# Patient Record
Sex: FEMALE | Race: WHITE
[De-identification: names, ages, dates, MRNs, and addresses within clinical notes are randomized per-mention and may not be internally consistent; named-entity substitution may affect disease eponyms.]

---

## 2017-10-25 ENCOUNTER — HOSPITAL ENCOUNTER (OUTPATIENT)
Dept: HOSPITAL 8 - STAR | Age: 62
Discharge: HOME | End: 2017-10-25
Attending: NEUROLOGICAL SURGERY
Payer: COMMERCIAL

## 2017-10-25 DIAGNOSIS — R79.1: ICD-10-CM

## 2017-10-25 DIAGNOSIS — M48.062: ICD-10-CM

## 2017-10-25 DIAGNOSIS — M43.16: ICD-10-CM

## 2017-10-25 DIAGNOSIS — Z01.818: Primary | ICD-10-CM

## 2017-10-25 LAB
AST SERPL-CCNC: 17 U/L (ref 15–37)
BUN SERPL-MCNC: 16 MG/DL (ref 7–18)
HCT VFR BLD CALC: 38.6 % (ref 34.6–47.8)
HGB BLD-MCNC: 12.4 G/DL (ref 11.7–16.4)
WBC # BLD AUTO: 8.9 X10^3/UL (ref 3.4–10)

## 2017-10-25 PROCEDURE — 85025 COMPLETE CBC W/AUTO DIFF WBC: CPT

## 2017-10-25 PROCEDURE — 93005 ELECTROCARDIOGRAM TRACING: CPT

## 2017-10-25 PROCEDURE — 36415 COLL VENOUS BLD VENIPUNCTURE: CPT

## 2017-10-25 PROCEDURE — 85610 PROTHROMBIN TIME: CPT

## 2017-10-25 PROCEDURE — 85730 THROMBOPLASTIN TIME PARTIAL: CPT

## 2017-10-25 PROCEDURE — 81003 URINALYSIS AUTO W/O SCOPE: CPT

## 2017-10-25 PROCEDURE — 71020: CPT

## 2017-10-25 PROCEDURE — 80053 COMPREHEN METABOLIC PANEL: CPT

## 2018-10-29 ENCOUNTER — HOSPITAL ENCOUNTER (OUTPATIENT)
Dept: HOSPITAL 8 - RAD | Age: 63
Discharge: HOME | End: 2018-10-29
Attending: NURSE PRACTITIONER
Payer: COMMERCIAL

## 2018-10-29 DIAGNOSIS — R10.31: Primary | ICD-10-CM

## 2018-10-29 PROCEDURE — 76857 US EXAM PELVIC LIMITED: CPT

## 2018-11-02 ENCOUNTER — HOSPITAL ENCOUNTER (INPATIENT)
Dept: HOSPITAL 8 - ED | Age: 63
LOS: 8 days | Discharge: HOME | DRG: 329 | End: 2018-11-10
Attending: HOSPITALIST | Admitting: HOSPITALIST
Payer: COMMERCIAL

## 2018-11-02 VITALS — DIASTOLIC BLOOD PRESSURE: 78 MMHG | SYSTOLIC BLOOD PRESSURE: 134 MMHG

## 2018-11-02 VITALS — DIASTOLIC BLOOD PRESSURE: 97 MMHG | SYSTOLIC BLOOD PRESSURE: 148 MMHG

## 2018-11-02 VITALS — DIASTOLIC BLOOD PRESSURE: 68 MMHG | SYSTOLIC BLOOD PRESSURE: 123 MMHG

## 2018-11-02 VITALS — HEIGHT: 66 IN | BODY MASS INDEX: 24.34 KG/M2 | WEIGHT: 151.46 LBS

## 2018-11-02 VITALS — SYSTOLIC BLOOD PRESSURE: 144 MMHG | DIASTOLIC BLOOD PRESSURE: 66 MMHG

## 2018-11-02 VITALS — DIASTOLIC BLOOD PRESSURE: 87 MMHG | SYSTOLIC BLOOD PRESSURE: 134 MMHG

## 2018-11-02 VITALS — SYSTOLIC BLOOD PRESSURE: 132 MMHG | DIASTOLIC BLOOD PRESSURE: 77 MMHG

## 2018-11-02 DIAGNOSIS — E86.0: ICD-10-CM

## 2018-11-02 DIAGNOSIS — Z80.6: ICD-10-CM

## 2018-11-02 DIAGNOSIS — Z90.710: ICD-10-CM

## 2018-11-02 DIAGNOSIS — C18.2: ICD-10-CM

## 2018-11-02 DIAGNOSIS — D62: ICD-10-CM

## 2018-11-02 DIAGNOSIS — R63.0: ICD-10-CM

## 2018-11-02 DIAGNOSIS — G43.909: ICD-10-CM

## 2018-11-02 DIAGNOSIS — Z82.3: ICD-10-CM

## 2018-11-02 DIAGNOSIS — I80.8: ICD-10-CM

## 2018-11-02 DIAGNOSIS — D72.829: ICD-10-CM

## 2018-11-02 DIAGNOSIS — G89.3: ICD-10-CM

## 2018-11-02 DIAGNOSIS — F32.9: ICD-10-CM

## 2018-11-02 DIAGNOSIS — F41.9: ICD-10-CM

## 2018-11-02 DIAGNOSIS — G89.29: ICD-10-CM

## 2018-11-02 DIAGNOSIS — Z80.7: ICD-10-CM

## 2018-11-02 DIAGNOSIS — C77.2: ICD-10-CM

## 2018-11-02 DIAGNOSIS — Z80.3: ICD-10-CM

## 2018-11-02 DIAGNOSIS — C18.0: Primary | ICD-10-CM

## 2018-11-02 DIAGNOSIS — D12.5: ICD-10-CM

## 2018-11-02 DIAGNOSIS — K56.7: ICD-10-CM

## 2018-11-02 DIAGNOSIS — Z90.49: ICD-10-CM

## 2018-11-02 DIAGNOSIS — N17.0: ICD-10-CM

## 2018-11-02 DIAGNOSIS — F17.210: ICD-10-CM

## 2018-11-02 DIAGNOSIS — M48.00: ICD-10-CM

## 2018-11-02 LAB
% IRON SATURATION: 2 % (ref 20–55)
<PLATELET ESTIMATE>: (no result)
ALBUMIN SERPL-MCNC: 3.1 G/DL (ref 3.4–5)
ALP SERPL-CCNC: 96 U/L (ref 45–117)
ALT SERPL-CCNC: 19 U/L (ref 12–78)
ANION GAP SERPL CALC-SCNC: 9 MMOL/L (ref 5–15)
ANISOCYTOSIS BLD QL SMEAR: (no result)
BAND#(MANUAL): 0.1 X10^3/UL
BILIRUB SERPL-MCNC: 0.2 MG/DL (ref 0.2–1)
BURR CELLS BLD QL SMEAR: (no result)
CALCIUM SERPL-MCNC: 9 MG/DL (ref 8.5–10.1)
CHLORIDE SERPL-SCNC: 104 MMOL/L (ref 98–107)
CREAT SERPL-MCNC: 0.7 MG/DL (ref 0.55–1.02)
CULTURE INDICATED?: NO
EOS#(MANUAL): 0.29 X10^3/UL (ref 0–0.4)
EOS% (MANUAL): 3 % (ref 1–7)
ERYTHROCYTE [DISTWIDTH] IN BLOOD BY AUTOMATED COUNT: 21.9 % (ref 9.6–15.2)
HYPOCHROMIA BLD QL SMEAR: (no result)
IRON LEVEL: 11 MCG/DL (ref 50–170)
LYMPH#(MANUAL): 1.94 X10^3/UL (ref 1–3.4)
LYMPHS% (MANUAL): 20 % (ref 22–44)
MCH RBC QN AUTO: 19 PG (ref 27–34.8)
MCHC RBC AUTO-ENTMCNC: 29.9 G/DL (ref 32.4–35.8)
MCV RBC AUTO: 63.4 FL (ref 80–100)
MD: YES
MICROCYTES BLD QL SMEAR: (no result)
MICROSCOPIC: (no result)
MONOS#(MANUAL): 0.49 X10^3/UL (ref 0.3–2.7)
MONOS% (MANUAL): 5 % (ref 2–9)
NEUTS BAND NFR BLD: 1 % (ref 0–7)
OVALOCYTES BLD QL SMEAR: (no result)
PLATELET # BLD AUTO: 885 X10^3/UL (ref 130–400)
PMV BLD AUTO: 5.7 FL (ref 7.4–10.4)
POLYCHROMASIA BLD QL SMEAR: (no result)
PROT SERPL-MCNC: 7.7 G/DL (ref 6.4–8.2)
RBC # BLD AUTO: 3.42 X10^6/UL (ref 3.82–5.3)
SEG#(MANUAL): 6.89 X10^3/UL (ref 1.8–6.8)
SEGS% (MANUAL): 71 % (ref 42–75)
SMALL PLATELETS BLD QL SMEAR: (no result)
TIBC SERPL-MCNC: 450 MCG/DL (ref 250–450)

## 2018-11-02 PROCEDURE — 36415 COLL VENOUS BLD VENIPUNCTURE: CPT

## 2018-11-02 PROCEDURE — 82040 ASSAY OF SERUM ALBUMIN: CPT

## 2018-11-02 PROCEDURE — 84100 ASSAY OF PHOSPHORUS: CPT

## 2018-11-02 PROCEDURE — 83735 ASSAY OF MAGNESIUM: CPT

## 2018-11-02 PROCEDURE — 80048 BASIC METABOLIC PNL TOTAL CA: CPT

## 2018-11-02 PROCEDURE — 99152 MOD SED SAME PHYS/QHP 5/>YRS: CPT

## 2018-11-02 PROCEDURE — P9016 RBC LEUKOCYTES REDUCED: HCPCS

## 2018-11-02 PROCEDURE — 85018 HEMOGLOBIN: CPT

## 2018-11-02 PROCEDURE — 96374 THER/PROPH/DIAG INJ IV PUSH: CPT

## 2018-11-02 PROCEDURE — 85610 PROTHROMBIN TIME: CPT

## 2018-11-02 PROCEDURE — 83550 IRON BINDING TEST: CPT

## 2018-11-02 PROCEDURE — 85025 COMPLETE CBC W/AUTO DIFF WBC: CPT

## 2018-11-02 PROCEDURE — 36430 TRANSFUSION BLD/BLD COMPNT: CPT

## 2018-11-02 PROCEDURE — S0028 INJECTION, FAMOTIDINE, 20 MG: HCPCS

## 2018-11-02 PROCEDURE — 96375 TX/PRO/DX INJ NEW DRUG ADDON: CPT

## 2018-11-02 PROCEDURE — 99285 EMERGENCY DEPT VISIT HI MDM: CPT

## 2018-11-02 PROCEDURE — 83690 ASSAY OF LIPASE: CPT

## 2018-11-02 PROCEDURE — 86923 COMPATIBILITY TEST ELECTRIC: CPT

## 2018-11-02 PROCEDURE — 86900 BLOOD TYPING SEROLOGIC ABO: CPT

## 2018-11-02 PROCEDURE — 86850 RBC ANTIBODY SCREEN: CPT

## 2018-11-02 PROCEDURE — 93005 ELECTROCARDIOGRAM TRACING: CPT

## 2018-11-02 PROCEDURE — 88305 TISSUE EXAM BY PATHOLOGIST: CPT

## 2018-11-02 PROCEDURE — 83540 ASSAY OF IRON: CPT

## 2018-11-02 PROCEDURE — 88307 TISSUE EXAM BY PATHOLOGIST: CPT

## 2018-11-02 PROCEDURE — 74177 CT ABD & PELVIS W/CONTRAST: CPT

## 2018-11-02 PROCEDURE — 71260 CT THORAX DX C+: CPT

## 2018-11-02 PROCEDURE — 82378 CARCINOEMBRYONIC ANTIGEN: CPT

## 2018-11-02 PROCEDURE — 80053 COMPREHEN METABOLIC PANEL: CPT

## 2018-11-02 PROCEDURE — 30233N1 TRANSFUSION OF NONAUTOLOGOUS RED BLOOD CELLS INTO PERIPHERAL VEIN, PERCUTANEOUS APPROACH: ICD-10-PCS | Performed by: SURGERY

## 2018-11-02 PROCEDURE — 99153 MOD SED SAME PHYS/QHP EA: CPT

## 2018-11-02 PROCEDURE — 81003 URINALYSIS AUTO W/O SCOPE: CPT

## 2018-11-02 RX ADMIN — FAMOTIDINE SCH MG: 10 INJECTION INTRAVENOUS at 21:18

## 2018-11-02 RX ADMIN — PREGABALIN SCH MG: 25 CAPSULE ORAL at 21:00

## 2018-11-02 RX ADMIN — NICOTINE SCH PATCH: 14 PATCH, EXTENDED RELEASE TRANSDERMAL at 17:00

## 2018-11-03 VITALS — SYSTOLIC BLOOD PRESSURE: 149 MMHG | DIASTOLIC BLOOD PRESSURE: 74 MMHG

## 2018-11-03 VITALS — SYSTOLIC BLOOD PRESSURE: 138 MMHG | DIASTOLIC BLOOD PRESSURE: 74 MMHG

## 2018-11-03 VITALS — SYSTOLIC BLOOD PRESSURE: 151 MMHG | DIASTOLIC BLOOD PRESSURE: 82 MMHG

## 2018-11-03 VITALS — SYSTOLIC BLOOD PRESSURE: 143 MMHG | DIASTOLIC BLOOD PRESSURE: 80 MMHG

## 2018-11-03 LAB
<PLATELET ESTIMATE>: (no result)
ANION GAP SERPL CALC-SCNC: 8 MMOL/L (ref 5–15)
ANISOCYTOSIS BLD QL SMEAR: (no result)
CALCIUM SERPL-MCNC: 8.6 MG/DL (ref 8.5–10.1)
CHLORIDE SERPL-SCNC: 107 MMOL/L (ref 98–107)
CREAT SERPL-MCNC: 0.68 MG/DL (ref 0.55–1.02)
ERYTHROCYTE [DISTWIDTH] IN BLOOD BY AUTOMATED COUNT: 25.8 % (ref 9.6–15.2)
HYPOCHROMIA BLD QL SMEAR: (no result)
INR PPP: 1.02 (ref 0.93–1.1)
LYMPH#(MANUAL): 1.66 X10^3/UL (ref 1–3.4)
LYMPHS% (MANUAL): 18 % (ref 22–44)
MCH RBC QN AUTO: 20.8 PG (ref 27–34.8)
MCHC RBC AUTO-ENTMCNC: 31 G/DL (ref 32.4–35.8)
MCV RBC AUTO: 67 FL (ref 80–100)
MD: YES
MICROCYTES BLD QL SMEAR: (no result)
MONOS#(MANUAL): 1.01 X10^3/UL (ref 0.3–2.7)
MONOS% (MANUAL): 11 % (ref 2–9)
OVALOCYTES BLD QL SMEAR: (no result)
PLATELET # BLD AUTO: 722 X10^3/UL (ref 130–400)
PMV BLD AUTO: 5.7 FL (ref 7.4–10.4)
POLYCHROMASIA BLD QL SMEAR: (no result)
PROTHROMBIN TIME: 10.6 SECONDS (ref 9.6–11.5)
RBC # BLD AUTO: 3.56 X10^6/UL (ref 3.82–5.3)
SEG#(MANUAL): 6.53 X10^3/UL (ref 1.8–6.8)
SEGS% (MANUAL): 71 % (ref 42–75)
SMALL PLATELETS BLD QL SMEAR: (no result)

## 2018-11-03 PROCEDURE — 0DJD8ZZ INSPECTION OF LOWER INTESTINAL TRACT, VIA NATURAL OR ARTIFICIAL OPENING ENDOSCOPIC: ICD-10-PCS | Performed by: INTERNAL MEDICINE

## 2018-11-03 RX ADMIN — NICOTINE SCH PATCH: 14 PATCH, EXTENDED RELEASE TRANSDERMAL at 17:00

## 2018-11-03 RX ADMIN — PREGABALIN SCH MG: 25 CAPSULE ORAL at 19:50

## 2018-11-03 RX ADMIN — DULOXETINE HYDROCHLORIDE SCH MG: 30 CAPSULE, DELAYED RELEASE ORAL at 10:05

## 2018-11-03 RX ADMIN — FAMOTIDINE SCH MG: 10 INJECTION INTRAVENOUS at 19:50

## 2018-11-03 RX ADMIN — PREGABALIN SCH MG: 25 CAPSULE ORAL at 09:00

## 2018-11-03 RX ADMIN — DOCUSATE SODIUM 50MG AND SENNOSIDES 8.6MG SCH TAB: 8.6; 5 TABLET, FILM COATED ORAL at 10:05

## 2018-11-03 RX ADMIN — FAMOTIDINE SCH MG: 10 INJECTION INTRAVENOUS at 10:05

## 2018-11-04 VITALS — DIASTOLIC BLOOD PRESSURE: 77 MMHG | SYSTOLIC BLOOD PRESSURE: 132 MMHG

## 2018-11-04 VITALS — SYSTOLIC BLOOD PRESSURE: 137 MMHG | DIASTOLIC BLOOD PRESSURE: 72 MMHG

## 2018-11-04 VITALS — DIASTOLIC BLOOD PRESSURE: 80 MMHG | SYSTOLIC BLOOD PRESSURE: 136 MMHG

## 2018-11-04 VITALS — SYSTOLIC BLOOD PRESSURE: 129 MMHG | DIASTOLIC BLOOD PRESSURE: 83 MMHG

## 2018-11-04 LAB
<PLATELET ESTIMATE>: (no result)
<PLT MORPHOLOGY>: (no result)
ANION GAP SERPL CALC-SCNC: 9 MMOL/L (ref 5–15)
ANISOCYTOSIS BLD QL SMEAR: (no result)
CALCIUM SERPL-MCNC: 8.1 MG/DL (ref 8.5–10.1)
CHLORIDE SERPL-SCNC: 105 MMOL/L (ref 98–107)
CREAT SERPL-MCNC: 0.63 MG/DL (ref 0.55–1.02)
EOS#(MANUAL): 0.24 X10^3/UL (ref 0–0.4)
EOS% (MANUAL): 3 % (ref 1–7)
ERYTHROCYTE [DISTWIDTH] IN BLOOD BY AUTOMATED COUNT: 26.4 % (ref 9.6–15.2)
HYPOCHROMIA BLD QL SMEAR: (no result)
LYMPH#(MANUAL): 1.7 X10^3/UL (ref 1–3.4)
LYMPHS% (MANUAL): 21 % (ref 22–44)
MCH RBC QN AUTO: 20.3 PG (ref 27–34.8)
MCHC RBC AUTO-ENTMCNC: 30.2 G/DL (ref 32.4–35.8)
MCV RBC AUTO: 67.3 FL (ref 80–100)
MD: YES
MICROCYTES BLD QL SMEAR: (no result)
MONOS#(MANUAL): 0.81 X10^3/UL (ref 0.3–2.7)
MONOS% (MANUAL): 10 % (ref 2–9)
OVALOCYTES BLD QL SMEAR: (no result)
PLATELET # BLD AUTO: 742 X10^3/UL (ref 130–400)
PMV BLD AUTO: 5.8 FL (ref 7.4–10.4)
POLYCHROMASIA BLD QL SMEAR: (no result)
RBC # BLD AUTO: 3.68 X10^6/UL (ref 3.82–5.3)
SEG#(MANUAL): 5.35 X10^3/UL (ref 1.8–6.8)
SEGS% (MANUAL): 66 % (ref 42–75)

## 2018-11-04 PROCEDURE — 0DBK8ZX EXCISION OF ASCENDING COLON, VIA NATURAL OR ARTIFICIAL OPENING ENDOSCOPIC, DIAGNOSTIC: ICD-10-PCS | Performed by: INTERNAL MEDICINE

## 2018-11-04 PROCEDURE — 0DBN8ZZ EXCISION OF SIGMOID COLON, VIA NATURAL OR ARTIFICIAL OPENING ENDOSCOPIC: ICD-10-PCS | Performed by: INTERNAL MEDICINE

## 2018-11-04 RX ADMIN — DOCUSATE SODIUM 50MG AND SENNOSIDES 8.6MG SCH TAB: 8.6; 5 TABLET, FILM COATED ORAL at 09:00

## 2018-11-04 RX ADMIN — FAMOTIDINE SCH MG: 10 INJECTION INTRAVENOUS at 12:00

## 2018-11-04 RX ADMIN — PREGABALIN SCH MG: 25 CAPSULE ORAL at 09:00

## 2018-11-04 RX ADMIN — DULOXETINE HYDROCHLORIDE SCH MG: 30 CAPSULE, DELAYED RELEASE ORAL at 11:30

## 2018-11-04 RX ADMIN — PREGABALIN SCH MG: 25 CAPSULE ORAL at 21:00

## 2018-11-04 RX ADMIN — NICOTINE SCH PATCH: 14 PATCH, EXTENDED RELEASE TRANSDERMAL at 17:00

## 2018-11-04 RX ADMIN — FAMOTIDINE SCH MG: 20 TABLET, FILM COATED ORAL at 21:19

## 2018-11-04 RX ADMIN — IRON SUCROSE SCH MG: 20 INJECTION, SOLUTION INTRAVENOUS at 11:46

## 2018-11-05 VITALS — DIASTOLIC BLOOD PRESSURE: 64 MMHG | SYSTOLIC BLOOD PRESSURE: 117 MMHG

## 2018-11-05 VITALS — DIASTOLIC BLOOD PRESSURE: 75 MMHG | SYSTOLIC BLOOD PRESSURE: 127 MMHG

## 2018-11-05 VITALS — SYSTOLIC BLOOD PRESSURE: 128 MMHG | DIASTOLIC BLOOD PRESSURE: 76 MMHG

## 2018-11-05 VITALS — DIASTOLIC BLOOD PRESSURE: 77 MMHG | SYSTOLIC BLOOD PRESSURE: 117 MMHG

## 2018-11-05 LAB
<PLATELET ESTIMATE>: (no result)
ANION GAP SERPL CALC-SCNC: 9 MMOL/L (ref 5–15)
ANISOCYTOSIS BLD QL SMEAR: (no result)
BASOPHILS NFR BLD MANUAL: 1 % (ref 0–1)
BASOS#(MANUAL): 0.09 X10^3/UL (ref 0–0.1)
CALCIUM SERPL-MCNC: 8.9 MG/DL (ref 8.5–10.1)
CHLORIDE SERPL-SCNC: 109 MMOL/L (ref 98–107)
CREAT SERPL-MCNC: 0.84 MG/DL (ref 0.55–1.02)
EOS#(MANUAL): 0.09 X10^3/UL (ref 0–0.4)
EOS% (MANUAL): 1 % (ref 1–7)
ERYTHROCYTE [DISTWIDTH] IN BLOOD BY AUTOMATED COUNT: 26.7 % (ref 9.6–15.2)
HYPOCHROMIA BLD QL SMEAR: (no result)
LYMPH#(MANUAL): 1.49 X10^3/UL (ref 1–3.4)
LYMPHS% (MANUAL): 16 % (ref 22–44)
MCH RBC QN AUTO: 20.6 PG (ref 27–34.8)
MCHC RBC AUTO-ENTMCNC: 30.3 G/DL (ref 32.4–35.8)
MCV RBC AUTO: 68.2 FL (ref 80–100)
MD: YES
MICROCYTES BLD QL SMEAR: (no result)
MONOS#(MANUAL): 1.21 X10^3/UL (ref 0.3–2.7)
MONOS% (MANUAL): 13 % (ref 2–9)
OVALOCYTES BLD QL SMEAR: (no result)
PLATELET # BLD AUTO: 703 X10^3/UL (ref 130–400)
PMV BLD AUTO: 6.3 FL (ref 7.4–10.4)
POLYCHROMASIA BLD QL SMEAR: (no result)
RBC # BLD AUTO: 3.83 X10^6/UL (ref 3.82–5.3)
SEG#(MANUAL): 6.42 X10^3/UL (ref 1.8–6.8)
SEGS% (MANUAL): 69 % (ref 42–75)
SMALL PLATELETS BLD QL SMEAR: (no result)
SPHEROCYTES BLD QL SMEAR: (no result)

## 2018-11-05 RX ADMIN — IRON SUCROSE SCH MG: 20 INJECTION, SOLUTION INTRAVENOUS at 09:22

## 2018-11-05 RX ADMIN — FAMOTIDINE SCH MG: 20 TABLET, FILM COATED ORAL at 21:20

## 2018-11-05 RX ADMIN — DOCUSATE SODIUM 50MG AND SENNOSIDES 8.6MG SCH TAB: 8.6; 5 TABLET, FILM COATED ORAL at 09:00

## 2018-11-05 RX ADMIN — PREGABALIN SCH MG: 25 CAPSULE ORAL at 21:00

## 2018-11-05 RX ADMIN — DULOXETINE HYDROCHLORIDE SCH MG: 30 CAPSULE, DELAYED RELEASE ORAL at 09:00

## 2018-11-05 RX ADMIN — PREGABALIN SCH MG: 25 CAPSULE ORAL at 09:00

## 2018-11-05 RX ADMIN — FAMOTIDINE SCH MG: 20 TABLET, FILM COATED ORAL at 09:22

## 2018-11-05 RX ADMIN — NICOTINE SCH PATCH: 14 PATCH, EXTENDED RELEASE TRANSDERMAL at 17:00

## 2018-11-06 VITALS — SYSTOLIC BLOOD PRESSURE: 132 MMHG | DIASTOLIC BLOOD PRESSURE: 79 MMHG

## 2018-11-06 VITALS — DIASTOLIC BLOOD PRESSURE: 54 MMHG | SYSTOLIC BLOOD PRESSURE: 108 MMHG

## 2018-11-06 VITALS — SYSTOLIC BLOOD PRESSURE: 109 MMHG | DIASTOLIC BLOOD PRESSURE: 65 MMHG

## 2018-11-06 VITALS — DIASTOLIC BLOOD PRESSURE: 72 MMHG | SYSTOLIC BLOOD PRESSURE: 138 MMHG

## 2018-11-06 LAB
<PLATELET ESTIMATE>: (no result)
<PLT MORPHOLOGY>: (no result)
ANION GAP SERPL CALC-SCNC: 8 MMOL/L (ref 5–15)
ANISOCYTOSIS BLD QL SMEAR: (no result)
BAND#(MANUAL): 0.11 X10^3/UL
BASOPHILS NFR BLD MANUAL: 1 % (ref 0–1)
BASOS#(MANUAL): 0.11 X10^3/UL (ref 0–0.1)
CALCIUM SERPL-MCNC: 8.7 MG/DL (ref 8.5–10.1)
CHLORIDE SERPL-SCNC: 107 MMOL/L (ref 98–107)
CREAT SERPL-MCNC: 0.83 MG/DL (ref 0.55–1.02)
EOS#(MANUAL): 0.46 X10^3/UL (ref 0–0.4)
EOS% (MANUAL): 4 % (ref 1–7)
ERYTHROCYTE [DISTWIDTH] IN BLOOD BY AUTOMATED COUNT: 26.9 % (ref 9.6–15.2)
HYPOCHROMIA BLD QL SMEAR: (no result)
LYMPH#(MANUAL): 3.08 X10^3/UL (ref 1–3.4)
LYMPHS% (MANUAL): 27 % (ref 22–44)
MCH RBC QN AUTO: 20.9 PG (ref 27–34.8)
MCHC RBC AUTO-ENTMCNC: 30.8 G/DL (ref 32.4–35.8)
MCV RBC AUTO: 67.9 FL (ref 80–100)
MD: YES
MICROCYTES BLD QL SMEAR: (no result)
MONOS#(MANUAL): 0.68 X10^3/UL (ref 0.3–2.7)
MONOS% (MANUAL): 6 % (ref 2–9)
NEUTS BAND NFR BLD: 1 % (ref 0–7)
OVALOCYTES BLD QL SMEAR: (no result)
PLATELET # BLD AUTO: 702 X10^3/UL (ref 130–400)
PMV BLD AUTO: 6.1 FL (ref 7.4–10.4)
POLYCHROMASIA BLD QL SMEAR: (no result)
RBC # BLD AUTO: 3.63 X10^6/UL (ref 3.82–5.3)
SCHISTOCYTES BLD QL SMEAR: (no result)
SEG#(MANUAL): 6.95 X10^3/UL (ref 1.8–6.8)
SEGS% (MANUAL): 61 % (ref 42–75)

## 2018-11-06 PROCEDURE — 0DTF4ZZ RESECTION OF RIGHT LARGE INTESTINE, PERCUTANEOUS ENDOSCOPIC APPROACH: ICD-10-PCS | Performed by: SURGERY

## 2018-11-06 PROCEDURE — 8E0W4CZ ROBOTIC ASSISTED PROCEDURE OF TRUNK REGION, PERCUTANEOUS ENDOSCOPIC APPROACH: ICD-10-PCS | Performed by: SURGERY

## 2018-11-06 PROCEDURE — 0D1H4Z4 BYPASS CECUM TO CUTANEOUS, PERCUTANEOUS ENDOSCOPIC APPROACH: ICD-10-PCS | Performed by: SURGERY

## 2018-11-06 RX ADMIN — PREGABALIN SCH MG: 25 CAPSULE ORAL at 09:00

## 2018-11-06 RX ADMIN — CEFTRIAXONE SCH MLS/HR: 2 INJECTION, SOLUTION INTRAVENOUS at 20:56

## 2018-11-06 RX ADMIN — NICOTINE SCH PATCH: 14 PATCH, EXTENDED RELEASE TRANSDERMAL at 09:21

## 2018-11-06 RX ADMIN — DULOXETINE HYDROCHLORIDE SCH MG: 30 CAPSULE, DELAYED RELEASE ORAL at 09:00

## 2018-11-06 RX ADMIN — FAMOTIDINE SCH MG: 20 TABLET, FILM COATED ORAL at 09:00

## 2018-11-06 RX ADMIN — DOCUSATE SODIUM 50MG AND SENNOSIDES 8.6MG SCH TAB: 8.6; 5 TABLET, FILM COATED ORAL at 09:00

## 2018-11-06 RX ADMIN — IRON SUCROSE SCH MG: 20 INJECTION, SOLUTION INTRAVENOUS at 09:20

## 2018-11-06 RX ADMIN — ACETAMINOPHEN SCH MLS/HR: 10 INJECTION, SOLUTION INTRAVENOUS at 23:59

## 2018-11-06 RX ADMIN — PREGABALIN SCH MG: 25 CAPSULE ORAL at 20:57

## 2018-11-06 RX ADMIN — ENOXAPARIN SODIUM SCH MG: 30 INJECTION SUBCUTANEOUS at 18:00

## 2018-11-06 RX ADMIN — NICOTINE SCH PATCH: 14 PATCH, EXTENDED RELEASE TRANSDERMAL at 18:00

## 2018-11-06 RX ADMIN — ACETAMINOPHEN SCH MLS/HR: 10 INJECTION, SOLUTION INTRAVENOUS at 18:30

## 2018-11-06 RX ADMIN — FAMOTIDINE SCH MG: 20 TABLET, FILM COATED ORAL at 20:57

## 2018-11-07 VITALS — SYSTOLIC BLOOD PRESSURE: 115 MMHG | DIASTOLIC BLOOD PRESSURE: 65 MMHG

## 2018-11-07 VITALS — SYSTOLIC BLOOD PRESSURE: 115 MMHG | DIASTOLIC BLOOD PRESSURE: 66 MMHG

## 2018-11-07 VITALS — DIASTOLIC BLOOD PRESSURE: 53 MMHG | SYSTOLIC BLOOD PRESSURE: 110 MMHG

## 2018-11-07 VITALS — SYSTOLIC BLOOD PRESSURE: 115 MMHG | DIASTOLIC BLOOD PRESSURE: 72 MMHG

## 2018-11-07 LAB
<PLATELET ESTIMATE>: (no result)
<PLT MORPHOLOGY>: (no result)
ALBUMIN SERPL-MCNC: 2.6 G/DL (ref 3.4–5)
ALP SERPL-CCNC: 84 U/L (ref 45–117)
ALT SERPL-CCNC: 20 U/L (ref 12–78)
ANION GAP SERPL CALC-SCNC: 9 MMOL/L (ref 5–15)
ANISOCYTOSIS BLD QL SMEAR: (no result)
BILIRUB SERPL-MCNC: 0.2 MG/DL (ref 0.2–1)
CALCIUM SERPL-MCNC: 8.1 MG/DL (ref 8.5–10.1)
CHLORIDE SERPL-SCNC: 106 MMOL/L (ref 98–107)
CREAT SERPL-MCNC: 1.06 MG/DL (ref 0.55–1.02)
ERYTHROCYTE [DISTWIDTH] IN BLOOD BY AUTOMATED COUNT: 27.8 % (ref 9.6–15.2)
HYPOCHROMIA BLD QL SMEAR: (no result)
LYMPH#(MANUAL): 0.31 X10^3/UL (ref 1–3.4)
LYMPHS% (MANUAL): 2 % (ref 22–44)
MCH RBC QN AUTO: 21.1 PG (ref 27–34.8)
MCHC RBC AUTO-ENTMCNC: 30.6 G/DL (ref 32.4–35.8)
MCV RBC AUTO: 69 FL (ref 80–100)
MD: YES
MICROCYTES BLD QL SMEAR: (no result)
OVALOCYTES BLD QL SMEAR: (no result)
PLATELET # BLD AUTO: 678 X10^3/UL (ref 130–400)
PMV BLD AUTO: 6.3 FL (ref 7.4–10.4)
POLYCHROMASIA BLD QL SMEAR: (no result)
PROT SERPL-MCNC: 6.8 G/DL (ref 6.4–8.2)
RBC # BLD AUTO: 3.48 X10^6/UL (ref 3.82–5.3)
SEG#(MANUAL): 15.09 X10^3/UL (ref 1.8–6.8)
SEGS% (MANUAL): 98 % (ref 42–75)
TOXIC GRAN: (no result)

## 2018-11-07 RX ADMIN — ENOXAPARIN SODIUM SCH MG: 30 INJECTION SUBCUTANEOUS at 20:41

## 2018-11-07 RX ADMIN — ACETAMINOPHEN SCH MLS/HR: 10 INJECTION, SOLUTION INTRAVENOUS at 05:48

## 2018-11-07 RX ADMIN — ENOXAPARIN SODIUM SCH MG: 30 INJECTION SUBCUTANEOUS at 08:36

## 2018-11-07 RX ADMIN — IRON SUCROSE SCH MG: 20 INJECTION, SOLUTION INTRAVENOUS at 08:37

## 2018-11-07 RX ADMIN — OXYCODONE HYDROCHLORIDE PRN MG: 5 TABLET ORAL at 17:52

## 2018-11-07 RX ADMIN — ACETAMINOPHEN SCH MLS/HR: 10 INJECTION, SOLUTION INTRAVENOUS at 11:49

## 2018-11-07 RX ADMIN — FAMOTIDINE SCH MG: 20 TABLET, FILM COATED ORAL at 08:37

## 2018-11-07 RX ADMIN — NICOTINE SCH PATCH: 14 PATCH, EXTENDED RELEASE TRANSDERMAL at 16:55

## 2018-11-07 RX ADMIN — PREGABALIN SCH MG: 25 CAPSULE ORAL at 21:00

## 2018-11-07 RX ADMIN — FAMOTIDINE SCH MG: 20 TABLET, FILM COATED ORAL at 20:41

## 2018-11-07 RX ADMIN — NICOTINE SCH PATCH: 14 PATCH, EXTENDED RELEASE TRANSDERMAL at 18:00

## 2018-11-07 RX ADMIN — DULOXETINE HYDROCHLORIDE SCH MG: 30 CAPSULE, DELAYED RELEASE ORAL at 08:37

## 2018-11-07 RX ADMIN — SODIUM CHLORIDE, SODIUM LACTATE, POTASSIUM CHLORIDE, AND CALCIUM CHLORIDE SCH MLS/HR: .6; .31; .03; .02 INJECTION, SOLUTION INTRAVENOUS at 23:02

## 2018-11-07 RX ADMIN — DOCUSATE SODIUM 50MG AND SENNOSIDES 8.6MG SCH TAB: 8.6; 5 TABLET, FILM COATED ORAL at 08:37

## 2018-11-07 RX ADMIN — CEFTRIAXONE SCH MLS/HR: 2 INJECTION, SOLUTION INTRAVENOUS at 18:21

## 2018-11-07 RX ADMIN — PREGABALIN SCH MG: 25 CAPSULE ORAL at 08:36

## 2018-11-08 VITALS — SYSTOLIC BLOOD PRESSURE: 111 MMHG | DIASTOLIC BLOOD PRESSURE: 61 MMHG

## 2018-11-08 VITALS — SYSTOLIC BLOOD PRESSURE: 116 MMHG | DIASTOLIC BLOOD PRESSURE: 81 MMHG

## 2018-11-08 VITALS — DIASTOLIC BLOOD PRESSURE: 66 MMHG | SYSTOLIC BLOOD PRESSURE: 113 MMHG

## 2018-11-08 VITALS — SYSTOLIC BLOOD PRESSURE: 134 MMHG | DIASTOLIC BLOOD PRESSURE: 73 MMHG

## 2018-11-08 VITALS — DIASTOLIC BLOOD PRESSURE: 79 MMHG | SYSTOLIC BLOOD PRESSURE: 133 MMHG

## 2018-11-08 VITALS — SYSTOLIC BLOOD PRESSURE: 131 MMHG | DIASTOLIC BLOOD PRESSURE: 72 MMHG

## 2018-11-08 VITALS — DIASTOLIC BLOOD PRESSURE: 62 MMHG | SYSTOLIC BLOOD PRESSURE: 112 MMHG

## 2018-11-08 VITALS — DIASTOLIC BLOOD PRESSURE: 71 MMHG | SYSTOLIC BLOOD PRESSURE: 131 MMHG

## 2018-11-08 VITALS — SYSTOLIC BLOOD PRESSURE: 132 MMHG | DIASTOLIC BLOOD PRESSURE: 74 MMHG

## 2018-11-08 VITALS — SYSTOLIC BLOOD PRESSURE: 128 MMHG | DIASTOLIC BLOOD PRESSURE: 77 MMHG

## 2018-11-08 LAB
<PLATELET ESTIMATE>: (no result)
<PLT MORPHOLOGY>: (no result)
ALBUMIN SERPL-MCNC: 2.4 G/DL (ref 3.4–5)
ALP SERPL-CCNC: 75 U/L (ref 45–117)
ALT SERPL-CCNC: 19 U/L (ref 12–78)
ANION GAP SERPL CALC-SCNC: 4 MMOL/L (ref 5–15)
ANISOCYTOSIS BLD QL SMEAR: (no result)
BILIRUB SERPL-MCNC: 0.2 MG/DL (ref 0.2–1)
CALCIUM SERPL-MCNC: 8.5 MG/DL (ref 8.5–10.1)
CHLORIDE SERPL-SCNC: 110 MMOL/L (ref 98–107)
CREAT SERPL-MCNC: 0.75 MG/DL (ref 0.55–1.02)
EOS#(MANUAL): 0.12 X10^3/UL (ref 0–0.4)
EOS% (MANUAL): 1 % (ref 1–7)
ERYTHROCYTE [DISTWIDTH] IN BLOOD BY AUTOMATED COUNT: 29.5 % (ref 9.6–15.2)
HYPOCHROMIA BLD QL SMEAR: (no result)
LYMPH#(MANUAL): 1.39 X10^3/UL (ref 1–3.4)
LYMPHS% (MANUAL): 12 % (ref 22–44)
MCH RBC QN AUTO: 21.4 PG (ref 27–34.8)
MCHC RBC AUTO-ENTMCNC: 30.5 G/DL (ref 32.4–35.8)
MCV RBC AUTO: 70.1 FL (ref 80–100)
MD: YES
MICROCYTES BLD QL SMEAR: (no result)
MONOS#(MANUAL): 0.12 X10^3/UL (ref 0.3–2.7)
MONOS% (MANUAL): 1 % (ref 2–9)
OVALOCYTES BLD QL SMEAR: (no result)
PLATELET # BLD AUTO: 658 X10^3/UL (ref 130–400)
PMV BLD AUTO: 6.2 FL (ref 7.4–10.4)
POLYCHROMASIA BLD QL SMEAR: (no result)
PROT SERPL-MCNC: 6.3 G/DL (ref 6.4–8.2)
RBC # BLD AUTO: 2.99 X10^6/UL (ref 3.82–5.3)
SEG#(MANUAL): 9.98 X10^3/UL (ref 1.8–6.8)
SEGS% (MANUAL): 86 % (ref 42–75)

## 2018-11-08 RX ADMIN — OXYCODONE HYDROCHLORIDE PRN MG: 5 TABLET ORAL at 16:22

## 2018-11-08 RX ADMIN — DULOXETINE HYDROCHLORIDE SCH MG: 30 CAPSULE, DELAYED RELEASE ORAL at 09:05

## 2018-11-08 RX ADMIN — IRON SUCROSE SCH MG: 20 INJECTION, SOLUTION INTRAVENOUS at 09:04

## 2018-11-08 RX ADMIN — ENOXAPARIN SODIUM SCH MG: 30 INJECTION SUBCUTANEOUS at 19:30

## 2018-11-08 RX ADMIN — FAMOTIDINE SCH MG: 20 TABLET, FILM COATED ORAL at 09:06

## 2018-11-08 RX ADMIN — MAGNESIUM HYDROXIDE SCH ML: 1200 SUSPENSION ORAL at 20:05

## 2018-11-08 RX ADMIN — SODIUM CHLORIDE, SODIUM LACTATE, POTASSIUM CHLORIDE, AND CALCIUM CHLORIDE SCH MLS/HR: .6; .31; .03; .02 INJECTION, SOLUTION INTRAVENOUS at 09:05

## 2018-11-08 RX ADMIN — OXYCODONE HYDROCHLORIDE PRN MG: 5 TABLET ORAL at 09:05

## 2018-11-08 RX ADMIN — PREGABALIN SCH MG: 25 CAPSULE ORAL at 09:00

## 2018-11-08 RX ADMIN — NICOTINE SCH PATCH: 14 PATCH, EXTENDED RELEASE TRANSDERMAL at 18:00

## 2018-11-08 RX ADMIN — PREGABALIN SCH MG: 25 CAPSULE ORAL at 20:07

## 2018-11-08 RX ADMIN — DOCUSATE SODIUM 50MG AND SENNOSIDES 8.6MG SCH TAB: 8.6; 5 TABLET, FILM COATED ORAL at 09:05

## 2018-11-08 RX ADMIN — ENOXAPARIN SODIUM SCH MG: 30 INJECTION SUBCUTANEOUS at 09:00

## 2018-11-08 RX ADMIN — OXYCODONE HYDROCHLORIDE PRN MG: 5 TABLET ORAL at 01:48

## 2018-11-08 RX ADMIN — OXYCODONE HYDROCHLORIDE PRN MG: 5 TABLET ORAL at 23:38

## 2018-11-08 RX ADMIN — CEFTRIAXONE SCH MLS/HR: 2 INJECTION, SOLUTION INTRAVENOUS at 20:05

## 2018-11-08 RX ADMIN — FAMOTIDINE SCH MG: 20 TABLET, FILM COATED ORAL at 20:05

## 2018-11-09 VITALS — SYSTOLIC BLOOD PRESSURE: 156 MMHG | DIASTOLIC BLOOD PRESSURE: 88 MMHG

## 2018-11-09 VITALS — SYSTOLIC BLOOD PRESSURE: 155 MMHG | DIASTOLIC BLOOD PRESSURE: 88 MMHG

## 2018-11-09 VITALS — DIASTOLIC BLOOD PRESSURE: 87 MMHG | SYSTOLIC BLOOD PRESSURE: 140 MMHG

## 2018-11-09 VITALS — SYSTOLIC BLOOD PRESSURE: 139 MMHG | DIASTOLIC BLOOD PRESSURE: 91 MMHG

## 2018-11-09 LAB
<PLATELET ESTIMATE>: (no result)
<PLT MORPHOLOGY>: (no result)
ALBUMIN SERPL-MCNC: 2.6 G/DL (ref 3.4–5)
ALP SERPL-CCNC: 97 U/L (ref 45–117)
ALT SERPL-CCNC: 18 U/L (ref 12–78)
ANION GAP SERPL CALC-SCNC: 10 MMOL/L (ref 5–15)
ANISOCYTOSIS BLD QL SMEAR: (no result)
BAND#(MANUAL): 0.42 X10^3/UL
BILIRUB SERPL-MCNC: 0.4 MG/DL (ref 0.2–1)
CALCIUM SERPL-MCNC: 9.2 MG/DL (ref 8.5–10.1)
CHLORIDE SERPL-SCNC: 100 MMOL/L (ref 98–107)
CREAT SERPL-MCNC: 0.67 MG/DL (ref 0.55–1.02)
ERYTHROCYTE [DISTWIDTH] IN BLOOD BY AUTOMATED COUNT: 30 % (ref 9.6–15.2)
LYMPH#(MANUAL): 4.99 X10^3/UL (ref 1–3.4)
LYMPHS% (MANUAL): 24 % (ref 22–44)
MCH RBC QN AUTO: 23.8 PG (ref 27–34.8)
MCHC RBC AUTO-ENTMCNC: 32.1 G/DL (ref 32.4–35.8)
MCV RBC AUTO: 74 FL (ref 80–100)
MD: YES
MICROCYTES BLD QL SMEAR: (no result)
NEUTS BAND NFR BLD: 2 % (ref 0–7)
PLATELET # BLD AUTO: 729 X10^3/UL (ref 130–400)
PMV BLD AUTO: 6.9 FL (ref 7.4–10.4)
PROT SERPL-MCNC: 7 G/DL (ref 6.4–8.2)
RBC # BLD AUTO: 4.79 X10^6/UL (ref 3.82–5.3)
SEG#(MANUAL): 15.39 X10^3/UL (ref 1.8–6.8)
SEGS% (MANUAL): 74 % (ref 42–75)

## 2018-11-09 RX ADMIN — ENOXAPARIN SODIUM SCH MG: 30 INJECTION SUBCUTANEOUS at 09:31

## 2018-11-09 RX ADMIN — PREGABALIN SCH MG: 25 CAPSULE ORAL at 09:00

## 2018-11-09 RX ADMIN — PREGABALIN SCH MG: 25 CAPSULE ORAL at 20:16

## 2018-11-09 RX ADMIN — METOCLOPRAMIDE SCH MG: 5 INJECTION, SOLUTION INTRAMUSCULAR; INTRAVENOUS at 14:25

## 2018-11-09 RX ADMIN — CEFTRIAXONE SCH MLS/HR: 2 INJECTION, SOLUTION INTRAVENOUS at 20:15

## 2018-11-09 RX ADMIN — PREGABALIN SCH MG: 25 CAPSULE ORAL at 20:14

## 2018-11-09 RX ADMIN — DULOXETINE HYDROCHLORIDE SCH MG: 30 CAPSULE, DELAYED RELEASE ORAL at 09:31

## 2018-11-09 RX ADMIN — DOCUSATE SODIUM 50MG AND SENNOSIDES 8.6MG SCH TAB: 8.6; 5 TABLET, FILM COATED ORAL at 09:31

## 2018-11-09 RX ADMIN — MAGNESIUM HYDROXIDE SCH ML: 1200 SUSPENSION ORAL at 20:14

## 2018-11-09 RX ADMIN — NICOTINE SCH PATCH: 14 PATCH, EXTENDED RELEASE TRANSDERMAL at 18:00

## 2018-11-09 RX ADMIN — METOCLOPRAMIDE SCH MG: 5 INJECTION, SOLUTION INTRAMUSCULAR; INTRAVENOUS at 20:10

## 2018-11-09 RX ADMIN — ENOXAPARIN SODIUM SCH MG: 30 INJECTION SUBCUTANEOUS at 20:15

## 2018-11-09 RX ADMIN — METOCLOPRAMIDE SCH MG: 5 INJECTION, SOLUTION INTRAMUSCULAR; INTRAVENOUS at 09:31

## 2018-11-09 RX ADMIN — FAMOTIDINE SCH MG: 20 TABLET, FILM COATED ORAL at 20:14

## 2018-11-09 RX ADMIN — FAMOTIDINE SCH MG: 20 TABLET, FILM COATED ORAL at 09:31

## 2018-11-10 VITALS — SYSTOLIC BLOOD PRESSURE: 158 MMHG | DIASTOLIC BLOOD PRESSURE: 80 MMHG

## 2018-11-10 VITALS — SYSTOLIC BLOOD PRESSURE: 147 MMHG | DIASTOLIC BLOOD PRESSURE: 87 MMHG

## 2018-11-10 VITALS — SYSTOLIC BLOOD PRESSURE: 135 MMHG | DIASTOLIC BLOOD PRESSURE: 80 MMHG

## 2018-11-10 LAB
<PLATELET ESTIMATE>: (no result)
<PLT MORPHOLOGY>: (no result)
ALBUMIN SERPL-MCNC: 2.3 G/DL (ref 3.4–5)
ANION GAP SERPL CALC-SCNC: 10 MMOL/L (ref 5–15)
ANISOCYTOSIS BLD QL SMEAR: (no result)
CALCIUM SERPL-MCNC: 9 MG/DL (ref 8.5–10.1)
CHLORIDE SERPL-SCNC: 99 MMOL/L (ref 98–107)
CREAT SERPL-MCNC: 0.66 MG/DL (ref 0.55–1.02)
EOS#(MANUAL): 0.18 X10^3/UL (ref 0–0.4)
EOS% (MANUAL): 1 % (ref 1–7)
ERYTHROCYTE [DISTWIDTH] IN BLOOD BY AUTOMATED COUNT: 31.2 % (ref 9.6–15.2)
HYPOCHROMIA BLD QL SMEAR: (no result)
LYMPH#(MANUAL): 2.33 X10^3/UL (ref 1–3.4)
LYMPHS% (MANUAL): 13 % (ref 22–44)
MCH RBC QN AUTO: 23.9 PG (ref 27–34.8)
MCHC RBC AUTO-ENTMCNC: 31.9 G/DL (ref 32.4–35.8)
MCV RBC AUTO: 74.9 FL (ref 80–100)
MD: YES
MICROCYTES BLD QL SMEAR: (no result)
MONOS#(MANUAL): 1.07 X10^3/UL (ref 0.3–2.7)
MONOS% (MANUAL): 6 % (ref 2–9)
PLATELET # BLD AUTO: 808 X10^3/UL (ref 130–400)
PMV BLD AUTO: 6.8 FL (ref 7.4–10.4)
POLYCHROMASIA BLD QL SMEAR: (no result)
RBC # BLD AUTO: 4.83 X10^6/UL (ref 3.82–5.3)
SEG#(MANUAL): 14.32 X10^3/UL (ref 1.8–6.8)
SEGS% (MANUAL): 80 % (ref 42–75)

## 2018-11-10 RX ADMIN — DOCUSATE SODIUM 50MG AND SENNOSIDES 8.6MG SCH TAB: 8.6; 5 TABLET, FILM COATED ORAL at 08:15

## 2018-11-10 RX ADMIN — ENOXAPARIN SODIUM SCH MG: 30 INJECTION SUBCUTANEOUS at 08:15

## 2018-11-10 RX ADMIN — DULOXETINE HYDROCHLORIDE SCH MG: 30 CAPSULE, DELAYED RELEASE ORAL at 08:15

## 2018-11-10 RX ADMIN — METOCLOPRAMIDE SCH MG: 5 INJECTION, SOLUTION INTRAMUSCULAR; INTRAVENOUS at 14:26

## 2018-11-10 RX ADMIN — FAMOTIDINE SCH MG: 20 TABLET, FILM COATED ORAL at 08:15

## 2018-11-10 RX ADMIN — METOCLOPRAMIDE SCH MG: 5 INJECTION, SOLUTION INTRAMUSCULAR; INTRAVENOUS at 02:24

## 2018-11-10 RX ADMIN — METOCLOPRAMIDE SCH MG: 5 INJECTION, SOLUTION INTRAMUSCULAR; INTRAVENOUS at 08:15

## 2018-12-04 ENCOUNTER — TELEPHONE (OUTPATIENT)
Dept: HEMATOLOGY ONCOLOGY | Facility: MEDICAL CENTER | Age: 63
End: 2018-12-04

## 2018-12-04 NOTE — TELEPHONE ENCOUNTER
Called patent to check where she had her last imaging done at. Patient stated she had surgery and imaging done at [facility].  I will send a request for medical records.

## 2018-12-05 ENCOUNTER — HOSPITAL ENCOUNTER (OUTPATIENT)
Dept: RADIOLOGY | Facility: MEDICAL CENTER | Age: 63
End: 2018-12-05

## 2018-12-05 ENCOUNTER — DOCUMENTATION (OUTPATIENT)
Dept: HEMATOLOGY ONCOLOGY | Facility: MEDICAL CENTER | Age: 63
End: 2018-12-05

## 2018-12-05 NOTE — PROGRESS NOTES
Sent CD from [facility] Duplex UE Right  DOS 11/06/18 to Film Room to upload. Called dwight Spoke to Yohana Conf# [id number]

## 2019-01-17 ENCOUNTER — HOSPITAL ENCOUNTER (OUTPATIENT)
Dept: HOSPITAL 8 - OUT | Age: 64
Discharge: HOME | End: 2019-01-17
Attending: INTERNAL MEDICINE
Payer: COMMERCIAL

## 2019-01-17 VITALS — BODY MASS INDEX: 23.32 KG/M2 | WEIGHT: 148.59 LBS | HEIGHT: 67 IN

## 2019-01-17 VITALS — SYSTOLIC BLOOD PRESSURE: 157 MMHG | DIASTOLIC BLOOD PRESSURE: 88 MMHG

## 2019-01-17 DIAGNOSIS — Z98.49: ICD-10-CM

## 2019-01-17 DIAGNOSIS — C18.2: ICD-10-CM

## 2019-01-17 DIAGNOSIS — Z45.2: Primary | ICD-10-CM

## 2019-01-17 DIAGNOSIS — Z88.6: ICD-10-CM

## 2019-01-17 DIAGNOSIS — G43.909: ICD-10-CM

## 2019-01-17 DIAGNOSIS — F32.9: ICD-10-CM

## 2019-01-17 DIAGNOSIS — Z87.891: ICD-10-CM

## 2019-01-17 DIAGNOSIS — Z98.890: ICD-10-CM

## 2019-01-17 PROCEDURE — C1894 INTRO/SHEATH, NON-LASER: HCPCS

## 2019-01-17 PROCEDURE — 77001 FLUOROGUIDE FOR VEIN DEVICE: CPT

## 2019-01-17 PROCEDURE — 99156 MOD SED OTH PHYS/QHP 5/>YRS: CPT

## 2019-01-17 PROCEDURE — 36561 INSERT TUNNELED CV CATH: CPT

## 2019-01-17 PROCEDURE — 76937 US GUIDE VASCULAR ACCESS: CPT

## 2019-01-17 PROCEDURE — C1788 PORT, INDWELLING, IMP: HCPCS

## 2019-01-17 PROCEDURE — 99157 MOD SED OTHER PHYS/QHP EA: CPT

## 2019-02-22 ENCOUNTER — HOSPITAL ENCOUNTER (INPATIENT)
Dept: HOSPITAL 8 - ED | Age: 64
LOS: 3 days | Discharge: HOME | DRG: 392 | End: 2019-02-25
Attending: INTERNAL MEDICINE | Admitting: INTERNAL MEDICINE
Payer: COMMERCIAL

## 2019-02-22 VITALS — DIASTOLIC BLOOD PRESSURE: 72 MMHG | SYSTOLIC BLOOD PRESSURE: 141 MMHG

## 2019-02-22 VITALS — HEIGHT: 66 IN | BODY MASS INDEX: 24.66 KG/M2 | WEIGHT: 153.44 LBS

## 2019-02-22 DIAGNOSIS — Z90.49: ICD-10-CM

## 2019-02-22 DIAGNOSIS — D72.825: ICD-10-CM

## 2019-02-22 DIAGNOSIS — Z88.5: ICD-10-CM

## 2019-02-22 DIAGNOSIS — Z80.3: ICD-10-CM

## 2019-02-22 DIAGNOSIS — K52.9: Primary | ICD-10-CM

## 2019-02-22 DIAGNOSIS — E87.1: ICD-10-CM

## 2019-02-22 DIAGNOSIS — Z82.3: ICD-10-CM

## 2019-02-22 DIAGNOSIS — Z90.710: ICD-10-CM

## 2019-02-22 DIAGNOSIS — F32.9: ICD-10-CM

## 2019-02-22 DIAGNOSIS — K50.90: ICD-10-CM

## 2019-02-22 DIAGNOSIS — C18.9: ICD-10-CM

## 2019-02-22 DIAGNOSIS — N19: ICD-10-CM

## 2019-02-22 DIAGNOSIS — E87.6: ICD-10-CM

## 2019-02-22 DIAGNOSIS — Z87.891: ICD-10-CM

## 2019-02-22 DIAGNOSIS — Z98.1: ICD-10-CM

## 2019-02-22 LAB
<PLATELET ESTIMATE>: ADEQUATE
<PLT MORPHOLOGY>: (no result)
ALBUMIN SERPL-MCNC: 3.7 G/DL (ref 3.4–5)
ALP SERPL-CCNC: 102 U/L (ref 45–117)
ALT SERPL-CCNC: 36 U/L (ref 12–78)
ANION GAP SERPL CALC-SCNC: 8 MMOL/L (ref 5–15)
ANISOCYTOSIS BLD QL SMEAR: (no result)
BAND#(MANUAL): 1.07 X10^3/UL
BILIRUB SERPL-MCNC: 0.4 MG/DL (ref 0.2–1)
CALCIUM SERPL-MCNC: 8.5 MG/DL (ref 8.5–10.1)
CHLORIDE SERPL-SCNC: 100 MMOL/L (ref 98–107)
CLOSTRIDIUM DIFFICILE ANTIGEN: NEGATIVE
CLOSTRIDIUM DIFFICILE TOXIN: NEGATIVE
CREAT SERPL-MCNC: 1.07 MG/DL (ref 0.55–1.02)
CULTURE INDICATED?: NO
ERYTHROCYTE [DISTWIDTH] IN BLOOD BY AUTOMATED COUNT: 14.7 % (ref 9.6–15.2)
LYMPH#(MANUAL): 2.08 X10^3/UL (ref 1–3.4)
LYMPHS% (MANUAL): 33 % (ref 22–44)
MCH RBC QN AUTO: 31.2 PG (ref 27–34.8)
MCHC RBC AUTO-ENTMCNC: 34.3 G/DL (ref 32.4–35.8)
MCV RBC AUTO: 90.9 FL (ref 80–100)
MD: YES
METAMYELOCYTES# (MANUAL): 0.06 X10^3/UL (ref 0–0)
METAMYELOCYTES% (MANUAL): 1 % (ref 0–1)
MICROSCOPIC: (no result)
MONOS#(MANUAL): 1.45 X10^3/UL (ref 0.3–2.7)
MONOS% (MANUAL): 23 % (ref 2–9)
NEUTS BAND NFR BLD: 17 % (ref 0–7)
PLATELET # BLD AUTO: 255 X10^3/UL (ref 130–400)
PMV BLD AUTO: 6.9 FL (ref 7.4–10.4)
PROT SERPL-MCNC: 7.5 G/DL (ref 6.4–8.2)
RBC # BLD AUTO: 4.87 X10^6/UL (ref 3.82–5.3)
REACTIVE LYMPHS # (MANUAL): 0.32 X10^3/UL (ref 0–0)
REACTIVE LYMPHS % (MANUAL): 5 % (ref 0–0)
SEG#(MANUAL): 1.32 X10^3/UL (ref 1.8–6.8)
SEGS% (MANUAL): 21 % (ref 42–75)

## 2019-02-22 PROCEDURE — 80048 BASIC METABOLIC PNL TOTAL CA: CPT

## 2019-02-22 PROCEDURE — 87328 CRYPTOSPORIDIUM AG IA: CPT

## 2019-02-22 PROCEDURE — 80053 COMPREHEN METABOLIC PANEL: CPT

## 2019-02-22 PROCEDURE — 81001 URINALYSIS AUTO W/SCOPE: CPT

## 2019-02-22 PROCEDURE — 96361 HYDRATE IV INFUSION ADD-ON: CPT

## 2019-02-22 PROCEDURE — 85025 COMPLETE CBC W/AUTO DIFF WBC: CPT

## 2019-02-22 PROCEDURE — 87329 GIARDIA AG IA: CPT

## 2019-02-22 PROCEDURE — 83690 ASSAY OF LIPASE: CPT

## 2019-02-22 PROCEDURE — 89055 LEUKOCYTE ASSESSMENT FECAL: CPT

## 2019-02-22 PROCEDURE — 83735 ASSAY OF MAGNESIUM: CPT

## 2019-02-22 PROCEDURE — 74177 CT ABD & PELVIS W/CONTRAST: CPT

## 2019-02-22 PROCEDURE — 87324 CLOSTRIDIUM AG IA: CPT

## 2019-02-22 PROCEDURE — 87046 STOOL CULTR AEROBIC BACT EA: CPT

## 2019-02-22 PROCEDURE — 96374 THER/PROPH/DIAG INJ IV PUSH: CPT

## 2019-02-22 PROCEDURE — 36415 COLL VENOUS BLD VENIPUNCTURE: CPT

## 2019-02-22 PROCEDURE — 96375 TX/PRO/DX INJ NEW DRUG ADDON: CPT

## 2019-02-22 RX ADMIN — SODIUM CHLORIDE AND POTASSIUM CHLORIDE SCH MLS/HR: .9; .15 SOLUTION INTRAVENOUS at 22:21

## 2019-02-22 RX ADMIN — METRONIDAZOLE SCH MLS/HR: 500 INJECTION, SOLUTION INTRAVENOUS at 22:21

## 2019-02-22 RX ADMIN — HYDROMORPHONE HYDROCHLORIDE PRN MG: 2 INJECTION INTRAMUSCULAR; INTRAVENOUS; SUBCUTANEOUS at 22:22

## 2019-02-22 NOTE — NUR
REPORT FROM AMPARO RN. PT SAYS NAUSEA HAS IMPROVED. VSS. PT WAITING FOR CT 
RESULTS. CALL LIGHT IN REACH

## 2019-02-22 NOTE — NUR
PT TO ROOM FROM TRIAGE, PT REPORTS 3 DAY HX OF WATERY DIARRHEA AND VOMITING 
"WHENEVER I EAT OR DRINK", PT CURRENTLY UNDERGOING CHEMO FOR COLON CA. PT 
PLACED ON MONITOR, NAD, CALL LIGHT WITHIN REACH. INSTRUCTED TO OBTAIN URINE 
SAMPLE

## 2019-02-23 VITALS — DIASTOLIC BLOOD PRESSURE: 73 MMHG | SYSTOLIC BLOOD PRESSURE: 120 MMHG

## 2019-02-23 VITALS — DIASTOLIC BLOOD PRESSURE: 69 MMHG | SYSTOLIC BLOOD PRESSURE: 110 MMHG

## 2019-02-23 VITALS — SYSTOLIC BLOOD PRESSURE: 111 MMHG | DIASTOLIC BLOOD PRESSURE: 69 MMHG

## 2019-02-23 VITALS — SYSTOLIC BLOOD PRESSURE: 107 MMHG | DIASTOLIC BLOOD PRESSURE: 66 MMHG

## 2019-02-23 LAB
<PLATELET ESTIMATE>: ADEQUATE
<PLT MORPHOLOGY>: (no result)
ALBUMIN SERPL-MCNC: 2.7 G/DL (ref 3.4–5)
ALP SERPL-CCNC: 77 U/L (ref 45–117)
ALT SERPL-CCNC: 28 U/L (ref 12–78)
ANION GAP SERPL CALC-SCNC: 6 MMOL/L (ref 5–15)
ANISOCYTOSIS BLD QL SMEAR: (no result)
BAND#(MANUAL): 1.05 X10^3/UL
BILIRUB SERPL-MCNC: 0.2 MG/DL (ref 0.2–1)
CALCIUM SERPL-MCNC: 7.4 MG/DL (ref 8.5–10.1)
CHLORIDE SERPL-SCNC: 110 MMOL/L (ref 98–107)
CREAT SERPL-MCNC: 0.78 MG/DL (ref 0.55–1.02)
EOS#(MANUAL): 0.06 X10^3/UL (ref 0–0.4)
EOS% (MANUAL): 1 % (ref 1–7)
ERYTHROCYTE [DISTWIDTH] IN BLOOD BY AUTOMATED COUNT: 14.4 % (ref 9.6–15.2)
LYMPH#(MANUAL): 1.71 X10^3/UL (ref 1–3.4)
LYMPHS% (MANUAL): 31 % (ref 22–44)
MCH RBC QN AUTO: 30.7 PG (ref 27–34.8)
MCHC RBC AUTO-ENTMCNC: 33.5 G/DL (ref 32.4–35.8)
MCV RBC AUTO: 91.7 FL (ref 80–100)
MD: YES
METAMYELOCYTES# (MANUAL): 0.06 X10^3/UL (ref 0–0)
METAMYELOCYTES% (MANUAL): 1 % (ref 0–1)
MONOS#(MANUAL): 1.27 X10^3/UL (ref 0.3–2.7)
MONOS% (MANUAL): 23 % (ref 2–9)
NEUTS BAND NFR BLD: 19 % (ref 0–7)
PLATELET # BLD AUTO: 204 X10^3/UL (ref 130–400)
PMV BLD AUTO: 7.3 FL (ref 7.4–10.4)
POLYCHROMASIA BLD QL SMEAR: (no result)
PROT SERPL-MCNC: 5.9 G/DL (ref 6.4–8.2)
RBC # BLD AUTO: 4.17 X10^6/UL (ref 3.82–5.3)
SEG#(MANUAL): 1.38 X10^3/UL (ref 1.8–6.8)
SEGS% (MANUAL): 25 % (ref 42–75)

## 2019-02-23 RX ADMIN — HYDROMORPHONE HYDROCHLORIDE PRN MG: 2 INJECTION INTRAMUSCULAR; INTRAVENOUS; SUBCUTANEOUS at 08:37

## 2019-02-23 RX ADMIN — METRONIDAZOLE SCH MLS/HR: 500 INJECTION, SOLUTION INTRAVENOUS at 21:49

## 2019-02-23 RX ADMIN — METRONIDAZOLE SCH MLS/HR: 500 INJECTION, SOLUTION INTRAVENOUS at 13:28

## 2019-02-23 RX ADMIN — SODIUM CHLORIDE AND POTASSIUM CHLORIDE SCH MLS/HR: .9; .15 SOLUTION INTRAVENOUS at 16:42

## 2019-02-23 RX ADMIN — CEFTRIAXONE SCH MLS/HR: 1 INJECTION, SOLUTION INTRAVENOUS at 20:22

## 2019-02-23 RX ADMIN — Medication SCH TAB: at 08:37

## 2019-02-23 RX ADMIN — HYDROMORPHONE HYDROCHLORIDE PRN MG: 2 INJECTION INTRAMUSCULAR; INTRAVENOUS; SUBCUTANEOUS at 01:36

## 2019-02-23 RX ADMIN — DULOXETINE HYDROCHLORIDE SCH MG: 30 CAPSULE, DELAYED RELEASE ORAL at 08:37

## 2019-02-23 RX ADMIN — HYDROMORPHONE HYDROCHLORIDE PRN MG: 2 INJECTION INTRAMUSCULAR; INTRAVENOUS; SUBCUTANEOUS at 11:52

## 2019-02-23 RX ADMIN — METRONIDAZOLE SCH MLS/HR: 500 INJECTION, SOLUTION INTRAVENOUS at 05:59

## 2019-02-23 RX ADMIN — SODIUM CHLORIDE AND POTASSIUM CHLORIDE SCH MLS/HR: .9; .15 SOLUTION INTRAVENOUS at 08:53

## 2019-02-24 VITALS — DIASTOLIC BLOOD PRESSURE: 68 MMHG | SYSTOLIC BLOOD PRESSURE: 117 MMHG

## 2019-02-24 VITALS — SYSTOLIC BLOOD PRESSURE: 109 MMHG | DIASTOLIC BLOOD PRESSURE: 63 MMHG

## 2019-02-24 VITALS — DIASTOLIC BLOOD PRESSURE: 66 MMHG | SYSTOLIC BLOOD PRESSURE: 108 MMHG

## 2019-02-24 VITALS — SYSTOLIC BLOOD PRESSURE: 117 MMHG | DIASTOLIC BLOOD PRESSURE: 70 MMHG

## 2019-02-24 LAB
<PLATELET ESTIMATE>: ADEQUATE
<PLT MORPHOLOGY>: (no result)
ANION GAP SERPL CALC-SCNC: 5 MMOL/L (ref 5–15)
ANISOCYTOSIS BLD QL SMEAR: (no result)
BAND#(MANUAL): 0.83 X10^3/UL
CALCIUM SERPL-MCNC: 7.7 MG/DL (ref 8.5–10.1)
CHLORIDE SERPL-SCNC: 113 MMOL/L (ref 98–107)
CREAT SERPL-MCNC: 0.61 MG/DL (ref 0.55–1.02)
ERYTHROCYTE [DISTWIDTH] IN BLOOD BY AUTOMATED COUNT: 14.8 % (ref 9.6–15.2)
LYMPH#(MANUAL): 1.41 X10^3/UL (ref 1–3.4)
LYMPHS% (MANUAL): 22 % (ref 22–44)
MCH RBC QN AUTO: 30.8 PG (ref 27–34.8)
MCHC RBC AUTO-ENTMCNC: 33 G/DL (ref 32.4–35.8)
MCV RBC AUTO: 93.2 FL (ref 80–100)
MD: YES
MONOS#(MANUAL): 0.9 X10^3/UL (ref 0.3–2.7)
MONOS% (MANUAL): 14 % (ref 2–9)
NEUTS BAND NFR BLD: 13 % (ref 0–7)
PLATELET # BLD AUTO: 196 X10^3/UL (ref 130–400)
PMV BLD AUTO: 7.2 FL (ref 7.4–10.4)
POLYCHROMASIA BLD QL SMEAR: (no result)
RBC # BLD AUTO: 4.06 X10^6/UL (ref 3.82–5.3)
REACTIVE LYMPHS # (MANUAL): 0.06 X10^3/UL (ref 0–0)
REACTIVE LYMPHS % (MANUAL): 1 % (ref 0–0)
SEG#(MANUAL): 3.2 X10^3/UL (ref 1.8–6.8)
SEGS% (MANUAL): 50 % (ref 42–75)

## 2019-02-24 RX ADMIN — METRONIDAZOLE SCH MLS/HR: 500 INJECTION, SOLUTION INTRAVENOUS at 14:16

## 2019-02-24 RX ADMIN — DULOXETINE HYDROCHLORIDE SCH MG: 30 CAPSULE, DELAYED RELEASE ORAL at 09:12

## 2019-02-24 RX ADMIN — CEFTRIAXONE SCH MLS/HR: 1 INJECTION, SOLUTION INTRAVENOUS at 20:22

## 2019-02-24 RX ADMIN — CHOLESTYRAMINE LIGHT SCH GM: 4 POWDER, FOR SUSPENSION ORAL at 16:00

## 2019-02-24 RX ADMIN — SODIUM CHLORIDE AND POTASSIUM CHLORIDE SCH MLS/HR: .9; .15 SOLUTION INTRAVENOUS at 03:21

## 2019-02-24 RX ADMIN — Medication SCH TAB: at 09:12

## 2019-02-24 RX ADMIN — CHOLESTYRAMINE LIGHT SCH GM: 4 POWDER, FOR SUSPENSION ORAL at 21:00

## 2019-02-24 RX ADMIN — CHOLESTYRAMINE LIGHT SCH GM: 4 POWDER, FOR SUSPENSION ORAL at 10:29

## 2019-02-24 RX ADMIN — METRONIDAZOLE SCH MLS/HR: 500 INJECTION, SOLUTION INTRAVENOUS at 22:22

## 2019-02-24 RX ADMIN — METRONIDAZOLE SCH MLS/HR: 500 INJECTION, SOLUTION INTRAVENOUS at 06:18

## 2019-02-25 VITALS — SYSTOLIC BLOOD PRESSURE: 113 MMHG | DIASTOLIC BLOOD PRESSURE: 69 MMHG

## 2019-02-25 VITALS — DIASTOLIC BLOOD PRESSURE: 72 MMHG | SYSTOLIC BLOOD PRESSURE: 130 MMHG

## 2019-02-25 RX ADMIN — Medication SCH TAB: at 09:00

## 2019-02-25 RX ADMIN — DULOXETINE HYDROCHLORIDE SCH MG: 30 CAPSULE, DELAYED RELEASE ORAL at 09:00

## 2019-02-25 RX ADMIN — METRONIDAZOLE SCH MLS/HR: 500 INJECTION, SOLUTION INTRAVENOUS at 06:03

## 2019-02-25 RX ADMIN — CHOLESTYRAMINE LIGHT SCH GM: 4 POWDER, FOR SUSPENSION ORAL at 09:00

## 2019-12-04 ENCOUNTER — HOSPITAL ENCOUNTER (OUTPATIENT)
Dept: HOSPITAL 8 - CFH | Age: 64
Discharge: HOME | End: 2019-12-04
Attending: INTERNAL MEDICINE
Payer: COMMERCIAL

## 2019-12-04 DIAGNOSIS — J84.10: ICD-10-CM

## 2019-12-04 DIAGNOSIS — C18.2: Primary | ICD-10-CM

## 2019-12-04 DIAGNOSIS — Z87.891: ICD-10-CM

## 2019-12-04 PROCEDURE — 71260 CT THORAX DX C+: CPT

## 2019-12-04 PROCEDURE — 74177 CT ABD & PELVIS W/CONTRAST: CPT

## 2020-11-20 ENCOUNTER — HOSPITAL ENCOUNTER (OUTPATIENT)
Dept: HOSPITAL 8 - CFH | Age: 65
Discharge: HOME | End: 2020-11-20
Attending: INTERNAL MEDICINE
Payer: COMMERCIAL

## 2020-11-20 DIAGNOSIS — C18.2: Primary | ICD-10-CM

## 2020-11-20 PROCEDURE — 74177 CT ABD & PELVIS W/CONTRAST: CPT

## 2020-11-20 PROCEDURE — 71260 CT THORAX DX C+: CPT

## 2021-05-28 ENCOUNTER — OFFICE VISIT (OUTPATIENT)
Dept: URGENT CARE | Facility: PHYSICIAN GROUP | Age: 66
End: 2021-05-28
Payer: COMMERCIAL

## 2021-05-28 ENCOUNTER — APPOINTMENT (OUTPATIENT)
Dept: RADIOLOGY | Facility: IMAGING CENTER | Age: 66
End: 2021-05-28
Attending: NURSE PRACTITIONER
Payer: COMMERCIAL

## 2021-05-28 VITALS
OXYGEN SATURATION: 93 % | TEMPERATURE: 97.1 F | HEIGHT: 66 IN | HEART RATE: 83 BPM | BODY MASS INDEX: 27.16 KG/M2 | DIASTOLIC BLOOD PRESSURE: 84 MMHG | SYSTOLIC BLOOD PRESSURE: 124 MMHG | RESPIRATION RATE: 16 BRPM | WEIGHT: 169 LBS

## 2021-05-28 DIAGNOSIS — S69.91XA RIGHT WRIST INJURY, INITIAL ENCOUNTER: ICD-10-CM

## 2021-05-28 DIAGNOSIS — S59.901A ELBOW INJURY, RIGHT, INITIAL ENCOUNTER: ICD-10-CM

## 2021-05-28 DIAGNOSIS — S40.021A ARM CONTUSION, RIGHT, INITIAL ENCOUNTER: ICD-10-CM
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
## 2021-05-28 PROCEDURE — 73110 X-RAY EXAM OF WRIST: CPT | Mod: TC,FY,RT | Performed by: NURSE PRACTITIONER

## 2021-05-28 PROCEDURE — 99203 OFFICE O/P NEW LOW 30 MIN: CPT | Performed by: NURSE PRACTITIONER

## 2021-05-28 PROCEDURE — 73080 X-RAY EXAM OF ELBOW: CPT | Mod: TC,FY,RT | Performed by: NURSE PRACTITIONER

## 2021-05-28 ASSESSMENT — ENCOUNTER SYMPTOMS
WEAKNESS: 0
NECK PAIN: 0
CHILLS: 0
SENSORY CHANGE: 0
FOCAL WEAKNESS: 0
FEVER: 0
FALLS: 1
DIZZINESS: 0
HEADACHES: 0
BACK PAIN: 0

## 2021-05-28 NOTE — PROGRESS NOTES
"Subjective:   Marie Rhodes is a 66 y.o. female who presents for Arm Injury (R arm, bruising from fall, x1day )      HPI  66 year old female in urgent care for right elbow pain, forearm pain, wrist pain after sustaining a ground-level fall yesterday.  Patient denies feeling a pop or click.  States that she tripped over a tomato cage and fell forward.  Does not know how she fell.  Has not needed to take anything over-the-counter for symptoms.  Denies any paresthesias.  Has slight difficulty with movement due to  Pain.    Review of Systems   Constitutional: Negative for chills, fever and malaise/fatigue.   Musculoskeletal: Positive for falls and joint pain. Negative for back pain and neck pain.   Neurological: Negative for dizziness, sensory change, focal weakness, weakness and headaches.   All other systems reviewed and are negative.      There is no problem list on file for this patient.    Past Surgical History:   Procedure Laterality Date   • GYN SURGERY      Total hyst   • OTHER ABDOMINAL SURGERY      choly     Social History     Tobacco Use   • Smoking status: Current Every Day Smoker   • Smokeless tobacco: Never Used   Substance Use Topics   • Alcohol use: No   • Drug use: No      History reviewed. No pertinent family history.   (Allergies, Medications, & Tobacco/Substance Use were reconciled by the Medical Assistant and reviewed by myself. The family history is prepopulated)     Objective:     /84   Pulse 83   Temp 36.2 °C (97.1 °F) (Temporal)   Resp 16   Ht 1.676 m (5' 6\")   Wt 76.7 kg (169 lb)   SpO2 93%   BMI 27.28 kg/m²     Physical Exam  Vitals reviewed.   Constitutional:       Appearance: Normal appearance.   Cardiovascular:      Rate and Rhythm: Normal rate and regular rhythm.      Heart sounds: Normal heart sounds.   Pulmonary:      Effort: Pulmonary effort is normal.      Breath sounds: Normal breath sounds.   Musculoskeletal:      Right elbow: Swelling and effusion present. Normal " range of motion. Tenderness present.      Comments: Tenderness and STS jut proximal to right lateral epicondyle    Bruising noted to lateral forearm     TTP to right distal ulna    Skin:     Capillary Refill: Capillary refill takes less than 2 seconds.   Neurological:      Mental Status: She is alert and oriented to person, place, and time.   Psychiatric:         Mood and Affect: Mood normal.         Behavior: Behavior normal.         Thought Content: Thought content normal.         Judgment: Judgment normal.         Assessment/Plan:     1. Fall from ground level  DX-ELBOW-COMPLETE 3+ RIGHT    DX-WRIST-COMPLETE 3+ RIGHT   2. Elbow injury, right, initial encounter  DX-ELBOW-COMPLETE 3+ RIGHT   3. Right wrist injury, initial encounter  DX-WRIST-COMPLETE 3+ RIGHT   4. Arm contusion, right, initial encounter       Right elbow:      FINDINGS:  Bone mineralization is normal.  There is no evidence of fracture or dislocation.  Soft tissues are normal.     IMPRESSION:     No evidence of fracture or dislocation.                   Right wrist:   FINDINGS:  Bone mineralization is normal.  There is no evidence of fracture or dislocation.  Soft tissues are normal.  There is mild joint space narrowing and periarticular sclerosis.     IMPRESSION:     No evidence of fracture or dislocation.  Findings are consistent with mild osteoarthritis.  All images read and interpreted by radiology - discussed with patient     Discussed physical examination findings as well as patient presentation, mechanism of injury, x-ray findings are consistent with contusions of the right elbow, wrist, forearm we will treat symptomatically using Ace wrap, rest, ice, elevation and over-the-counter NSAIDs and Tylenol per 's instructions.    Patient expressed understanding agrees to plan is no further questions at this time.    Differential diagnosis, natural history, supportive care, and indications for immediate follow-up discussed.    Advised  the patient to follow-up with the primary care physician for recheck, reevaluation, and consideration of further management.    Please note that this dictation was created using voice recognition software. I have made a reasonable attempt to correct obvious errors, but I expect that there are errors of grammar and possibly content that I did not discover before finalizing the note.    This note was electronically signed CHELLE Mcdowell

## 2021-07-06 ENCOUNTER — HOSPITAL ENCOUNTER (OUTPATIENT)
Dept: LAB | Facility: MEDICAL CENTER | Age: 66
End: 2021-07-06
Attending: INTERNAL MEDICINE
Payer: COMMERCIAL

## 2021-07-06 PROCEDURE — 82378 CARCINOEMBRYONIC ANTIGEN: CPT

## 2021-07-06 PROCEDURE — 80053 COMPREHEN METABOLIC PANEL: CPT

## 2021-07-06 PROCEDURE — 36415 COLL VENOUS BLD VENIPUNCTURE: CPT

## 2021-07-06 PROCEDURE — 85025 COMPLETE CBC W/AUTO DIFF WBC: CPT

## 2021-07-07 LAB
ALBUMIN SERPL BCP-MCNC: 4.4 G/DL (ref 3.2–4.9)
ALBUMIN/GLOB SERPL: 1.5 G/DL
ALP SERPL-CCNC: 89 U/L (ref 30–99)
ALT SERPL-CCNC: 13 U/L (ref 2–50)
ANION GAP SERPL CALC-SCNC: 12 MMOL/L (ref 7–16)
AST SERPL-CCNC: 22 U/L (ref 12–45)
BASOPHILS # BLD AUTO: 0.8 % (ref 0–1.8)
BASOPHILS # BLD: 0.06 K/UL (ref 0–0.12)
BILIRUB SERPL-MCNC: 0.2 MG/DL (ref 0.1–1.5)
BUN SERPL-MCNC: 16 MG/DL (ref 8–22)
CALCIUM SERPL-MCNC: 9.7 MG/DL (ref 8.5–10.5)
CEA SERPL-MCNC: 9.9 NG/ML (ref 0–3)
CHLORIDE SERPL-SCNC: 107 MMOL/L (ref 96–112)
CO2 SERPL-SCNC: 24 MMOL/L (ref 20–33)
CREAT SERPL-MCNC: 0.92 MG/DL (ref 0.5–1.4)
EOSINOPHIL # BLD AUTO: 0.09 K/UL (ref 0–0.51)
EOSINOPHIL NFR BLD: 1.2 % (ref 0–6.9)
ERYTHROCYTE [DISTWIDTH] IN BLOOD BY AUTOMATED COUNT: 50.4 FL (ref 35.9–50)
GLOBULIN SER CALC-MCNC: 2.9 G/DL (ref 1.9–3.5)
GLUCOSE SERPL-MCNC: 94 MG/DL (ref 65–99)
HCT VFR BLD AUTO: 44.5 % (ref 37–47)
HGB BLD-MCNC: 14.6 G/DL (ref 12–16)
IMM GRANULOCYTES # BLD AUTO: 0.02 K/UL (ref 0–0.11)
IMM GRANULOCYTES NFR BLD AUTO: 0.3 % (ref 0–0.9)
LYMPHOCYTES # BLD AUTO: 2.23 K/UL (ref 1–4.8)
LYMPHOCYTES NFR BLD: 30.7 % (ref 22–41)
MCH RBC QN AUTO: 32.4 PG (ref 27–33)
MCHC RBC AUTO-ENTMCNC: 32.8 G/DL (ref 33.6–35)
MCV RBC AUTO: 98.9 FL (ref 81.4–97.8)
MONOCYTES # BLD AUTO: 0.78 K/UL (ref 0–0.85)
MONOCYTES NFR BLD AUTO: 10.7 % (ref 0–13.4)
NEUTROPHILS # BLD AUTO: 4.09 K/UL (ref 2–7.15)
NEUTROPHILS NFR BLD: 56.3 % (ref 44–72)
NRBC # BLD AUTO: 0 K/UL
NRBC BLD-RTO: 0 /100 WBC
PLATELET # BLD AUTO: 274 K/UL (ref 164–446)
PMV BLD AUTO: 10.1 FL (ref 9–12.9)
POTASSIUM SERPL-SCNC: 4.5 MMOL/L (ref 3.6–5.5)
PROT SERPL-MCNC: 7.3 G/DL (ref 6–8.2)
RBC # BLD AUTO: 4.5 M/UL (ref 4.2–5.4)
SODIUM SERPL-SCNC: 143 MMOL/L (ref 135–145)
WBC # BLD AUTO: 7.3 K/UL (ref 4.8–10.8)

## 2021-11-01 ENCOUNTER — HOSPITAL ENCOUNTER (OUTPATIENT)
Dept: LAB | Facility: MEDICAL CENTER | Age: 66
End: 2021-11-01
Attending: INTERNAL MEDICINE
Payer: COMMERCIAL

## 2021-11-01 ENCOUNTER — HOSPITAL ENCOUNTER (OUTPATIENT)
Dept: LAB | Facility: MEDICAL CENTER | Age: 66
End: 2021-11-01
Attending: NURSE PRACTITIONER
Payer: COMMERCIAL

## 2021-11-01 LAB
25(OH)D3 SERPL-MCNC: 28 NG/ML (ref 30–100)
ALBUMIN SERPL BCP-MCNC: 4.5 G/DL (ref 3.2–4.9)
ALBUMIN/GLOB SERPL: 1.8 G/DL
ALP SERPL-CCNC: 95 U/L (ref 30–99)
ALT SERPL-CCNC: 17 U/L (ref 2–50)
ANION GAP SERPL CALC-SCNC: 12 MMOL/L (ref 7–16)
AST SERPL-CCNC: 17 U/L (ref 12–45)
BASOPHILS # BLD AUTO: 0.8 % (ref 0–1.8)
BASOPHILS # BLD: 0.06 K/UL (ref 0–0.12)
BILIRUB SERPL-MCNC: 0.4 MG/DL (ref 0.1–1.5)
BUN SERPL-MCNC: 18 MG/DL (ref 8–22)
CALCIUM SERPL-MCNC: 10.2 MG/DL (ref 8.5–10.5)
CEA SERPL-MCNC: 10.6 NG/ML (ref 0–3)
CHLORIDE SERPL-SCNC: 107 MMOL/L (ref 96–112)
CO2 SERPL-SCNC: 25 MMOL/L (ref 20–33)
CREAT SERPL-MCNC: 0.95 MG/DL (ref 0.5–1.4)
EOSINOPHIL # BLD AUTO: 0.14 K/UL (ref 0–0.51)
EOSINOPHIL NFR BLD: 2 % (ref 0–6.9)
ERYTHROCYTE [DISTWIDTH] IN BLOOD BY AUTOMATED COUNT: 48.9 FL (ref 35.9–50)
EST. AVERAGE GLUCOSE BLD GHB EST-MCNC: 111 MG/DL
GLOBULIN SER CALC-MCNC: 2.5 G/DL (ref 1.9–3.5)
GLUCOSE SERPL-MCNC: 95 MG/DL (ref 65–99)
HBA1C MFR BLD: 5.5 % (ref 4–5.6)
HCT VFR BLD AUTO: 44.6 % (ref 37–47)
HGB BLD-MCNC: 14.8 G/DL (ref 12–16)
IMM GRANULOCYTES # BLD AUTO: 0.01 K/UL (ref 0–0.11)
IMM GRANULOCYTES NFR BLD AUTO: 0.1 % (ref 0–0.9)
LYMPHOCYTES # BLD AUTO: 2.85 K/UL (ref 1–4.8)
LYMPHOCYTES NFR BLD: 40.1 % (ref 22–41)
MCH RBC QN AUTO: 32.3 PG (ref 27–33)
MCHC RBC AUTO-ENTMCNC: 33.2 G/DL (ref 33.6–35)
MCV RBC AUTO: 97.4 FL (ref 81.4–97.8)
MONOCYTES # BLD AUTO: 0.81 K/UL (ref 0–0.85)
MONOCYTES NFR BLD AUTO: 11.4 % (ref 0–13.4)
NEUTROPHILS # BLD AUTO: 3.24 K/UL (ref 2–7.15)
NEUTROPHILS NFR BLD: 45.6 % (ref 44–72)
NRBC # BLD AUTO: 0 K/UL
NRBC BLD-RTO: 0 /100 WBC
PLATELET # BLD AUTO: 282 K/UL (ref 164–446)
PMV BLD AUTO: 9.7 FL (ref 9–12.9)
POTASSIUM SERPL-SCNC: 4.2 MMOL/L (ref 3.6–5.5)
PROT SERPL-MCNC: 7 G/DL (ref 6–8.2)
RBC # BLD AUTO: 4.58 M/UL (ref 4.2–5.4)
SODIUM SERPL-SCNC: 144 MMOL/L (ref 135–145)
TSH SERPL DL<=0.005 MIU/L-ACNC: 1.87 UIU/ML (ref 0.38–5.33)
WBC # BLD AUTO: 7.1 K/UL (ref 4.8–10.8)

## 2021-11-01 PROCEDURE — 36415 COLL VENOUS BLD VENIPUNCTURE: CPT

## 2021-11-01 PROCEDURE — 84443 ASSAY THYROID STIM HORMONE: CPT

## 2021-11-01 PROCEDURE — 82378 CARCINOEMBRYONIC ANTIGEN: CPT

## 2021-11-01 PROCEDURE — 82306 VITAMIN D 25 HYDROXY: CPT

## 2021-11-01 PROCEDURE — 80053 COMPREHEN METABOLIC PANEL: CPT

## 2021-11-01 PROCEDURE — 80061 LIPID PANEL: CPT

## 2021-11-01 PROCEDURE — 85025 COMPLETE CBC W/AUTO DIFF WBC: CPT

## 2021-11-01 PROCEDURE — 83036 HEMOGLOBIN GLYCOSYLATED A1C: CPT

## 2021-11-03 LAB
CHOLEST SERPL-MCNC: 275 MG/DL (ref 100–199)
FASTING STATUS PATIENT QL REPORTED: NORMAL
HDLC SERPL-MCNC: 74 MG/DL
LDLC SERPL CALC-MCNC: 179 MG/DL
TRIGL SERPL-MCNC: 109 MG/DL (ref 0–149)

## 2022-11-10 ENCOUNTER — HOSPITAL ENCOUNTER (OUTPATIENT)
Dept: LAB | Facility: MEDICAL CENTER | Age: 67
End: 2022-11-10
Attending: INTERNAL MEDICINE
Payer: MEDICARE

## 2022-11-10 LAB
ALBUMIN SERPL BCP-MCNC: 4.6 G/DL (ref 3.2–4.9)
ALBUMIN/GLOB SERPL: 1.6 G/DL
ALP SERPL-CCNC: 98 U/L (ref 30–99)
ALT SERPL-CCNC: 20 U/L (ref 2–50)
ANION GAP SERPL CALC-SCNC: 10 MMOL/L (ref 7–16)
AST SERPL-CCNC: 24 U/L (ref 12–45)
BASOPHILS # BLD AUTO: 1.2 % (ref 0–1.8)
BASOPHILS # BLD: 0.09 K/UL (ref 0–0.12)
BILIRUB SERPL-MCNC: 0.3 MG/DL (ref 0.1–1.5)
BUN SERPL-MCNC: 19 MG/DL (ref 8–22)
CALCIUM SERPL-MCNC: 10.2 MG/DL (ref 8.5–10.5)
CEA SERPL-MCNC: 10.6 NG/ML (ref 0–3)
CHLORIDE SERPL-SCNC: 104 MMOL/L (ref 96–112)
CO2 SERPL-SCNC: 27 MMOL/L (ref 20–33)
CREAT SERPL-MCNC: 0.82 MG/DL (ref 0.5–1.4)
EOSINOPHIL # BLD AUTO: 0.1 K/UL (ref 0–0.51)
EOSINOPHIL NFR BLD: 1.3 % (ref 0–6.9)
ERYTHROCYTE [DISTWIDTH] IN BLOOD BY AUTOMATED COUNT: 46.5 FL (ref 35.9–50)
GFR SERPLBLD CREATININE-BSD FMLA CKD-EPI: 79 ML/MIN/1.73 M 2
GLOBULIN SER CALC-MCNC: 2.8 G/DL (ref 1.9–3.5)
GLUCOSE SERPL-MCNC: 68 MG/DL (ref 65–99)
HCT VFR BLD AUTO: 41.8 % (ref 37–47)
HGB BLD-MCNC: 14.2 G/DL (ref 12–16)
IMM GRANULOCYTES # BLD AUTO: 0.02 K/UL (ref 0–0.11)
IMM GRANULOCYTES NFR BLD AUTO: 0.3 % (ref 0–0.9)
LYMPHOCYTES # BLD AUTO: 2.81 K/UL (ref 1–4.8)
LYMPHOCYTES NFR BLD: 37.9 % (ref 22–41)
MCH RBC QN AUTO: 33.5 PG (ref 27–33)
MCHC RBC AUTO-ENTMCNC: 34 G/DL (ref 33.6–35)
MCV RBC AUTO: 98.6 FL (ref 81.4–97.8)
MONOCYTES # BLD AUTO: 0.83 K/UL (ref 0–0.85)
MONOCYTES NFR BLD AUTO: 11.2 % (ref 0–13.4)
NEUTROPHILS # BLD AUTO: 3.56 K/UL (ref 2–7.15)
NEUTROPHILS NFR BLD: 48.1 % (ref 44–72)
NRBC # BLD AUTO: 0 K/UL
NRBC BLD-RTO: 0 /100 WBC
PLATELET # BLD AUTO: 256 K/UL (ref 164–446)
PMV BLD AUTO: 10.2 FL (ref 9–12.9)
POTASSIUM SERPL-SCNC: 4.1 MMOL/L (ref 3.6–5.5)
PROT SERPL-MCNC: 7.4 G/DL (ref 6–8.2)
RBC # BLD AUTO: 4.24 M/UL (ref 4.2–5.4)
SODIUM SERPL-SCNC: 141 MMOL/L (ref 135–145)
WBC # BLD AUTO: 7.4 K/UL (ref 4.8–10.8)

## 2022-11-10 PROCEDURE — 36415 COLL VENOUS BLD VENIPUNCTURE: CPT

## 2022-11-10 PROCEDURE — 80053 COMPREHEN METABOLIC PANEL: CPT

## 2022-11-10 PROCEDURE — 85025 COMPLETE CBC W/AUTO DIFF WBC: CPT

## 2022-11-10 PROCEDURE — 82378 CARCINOEMBRYONIC ANTIGEN: CPT

## 2023-04-06 ENCOUNTER — APPOINTMENT (RX ONLY)
Dept: URBAN - METROPOLITAN AREA CLINIC 4 | Facility: CLINIC | Age: 68
Setting detail: DERMATOLOGY
End: 2023-04-06

## 2023-04-06 DIAGNOSIS — L57.0 ACTINIC KERATOSIS: ICD-10-CM

## 2023-04-06 DIAGNOSIS — Z71.89 OTHER SPECIFIED COUNSELING: ICD-10-CM

## 2023-04-06 DIAGNOSIS — L72.8 OTHER FOLLICULAR CYSTS OF THE SKIN AND SUBCUTANEOUS TISSUE: ICD-10-CM

## 2023-04-06 PROBLEM — D48.5 NEOPLASM OF UNCERTAIN BEHAVIOR OF SKIN: Status: ACTIVE | Noted: 2023-04-06

## 2023-04-06 PROCEDURE — ? COUNSELING

## 2023-04-06 PROCEDURE — 99204 OFFICE O/P NEW MOD 45 MIN: CPT

## 2023-04-06 PROCEDURE — ? DEFER

## 2023-04-06 PROCEDURE — ? PRESCRIPTION

## 2023-04-06 RX ORDER — FLUOROURACIL 2 G/40G
1 CREAM TOPICAL BID
Qty: 40 | Refills: 0 | Status: ERX | COMMUNITY
Start: 2023-04-06

## 2023-04-06 RX ADMIN — FLUOROURACIL 1: 2 CREAM TOPICAL at 00:00

## 2023-04-06 ASSESSMENT — LOCATION DETAILED DESCRIPTION DERM
LOCATION DETAILED: LEFT POSTAURICULAR CREASE
LOCATION DETAILED: RIGHT POSTERIOR EAR
LOCATION DETAILED: LEFT MEDIAL FOREHEAD

## 2023-04-06 ASSESSMENT — LOCATION SIMPLE DESCRIPTION DERM
LOCATION SIMPLE: RIGHT EAR
LOCATION SIMPLE: LEFT FOREHEAD
LOCATION SIMPLE: LEFT EAR

## 2023-04-06 ASSESSMENT — LOCATION ZONE DERM
LOCATION ZONE: FACE
LOCATION ZONE: EAR

## 2023-04-06 NOTE — PROCEDURE: DEFER
Detail Level: Detailed
X Size Of Lesion In Cm (Optional): 0
Procedure To Be Performed At Next Visit: Punch Excision
Introduction Text (Please End With A Colon): The following procedure was deferred: excision
Detail Level: Detailed

## 2023-06-18 ENCOUNTER — OFFICE VISIT (OUTPATIENT)
Dept: URGENT CARE | Facility: PHYSICIAN GROUP | Age: 68
End: 2023-06-18
Payer: MEDICARE

## 2023-06-18 ENCOUNTER — APPOINTMENT (OUTPATIENT)
Dept: RADIOLOGY | Facility: IMAGING CENTER | Age: 68
End: 2023-06-18
Attending: FAMILY MEDICINE
Payer: MEDICARE

## 2023-06-18 VITALS
SYSTOLIC BLOOD PRESSURE: 136 MMHG | OXYGEN SATURATION: 93 % | HEART RATE: 73 BPM | HEIGHT: 66 IN | DIASTOLIC BLOOD PRESSURE: 82 MMHG | BODY MASS INDEX: 25.55 KG/M2 | TEMPERATURE: 96.8 F | WEIGHT: 159 LBS | RESPIRATION RATE: 18 BRPM

## 2023-06-18 DIAGNOSIS — M20.012 MALLET DEFORMITY OF LEFT MIDDLE FINGER: ICD-10-CM

## 2023-06-18 DIAGNOSIS — S69.92XA INJURY OF FINGER OF LEFT HAND, INITIAL ENCOUNTER: ICD-10-CM

## 2023-06-18 PROCEDURE — 3075F SYST BP GE 130 - 139MM HG: CPT | Performed by: FAMILY MEDICINE

## 2023-06-18 PROCEDURE — 73140 X-RAY EXAM OF FINGER(S): CPT | Mod: TC,FY,LT | Performed by: RADIOLOGY

## 2023-06-18 PROCEDURE — 3079F DIAST BP 80-89 MM HG: CPT | Performed by: FAMILY MEDICINE

## 2023-06-18 PROCEDURE — 99214 OFFICE O/P EST MOD 30 MIN: CPT | Performed by: FAMILY MEDICINE

## 2023-06-18 RX ORDER — ATORVASTATIN CALCIUM 20 MG/1
20 TABLET, FILM COATED ORAL DAILY
COMMUNITY
Start: 2023-06-13

## 2023-06-18 ASSESSMENT — FIBROSIS 4 INDEX: FIB4 SCORE: 1.38

## 2023-06-18 NOTE — PROGRESS NOTES
Chief Complaint:    Chief Complaint   Patient presents with    Hand Injury     Finger pain, heard a pop in middle left hand now finger is stuck bent. X 1 day        History of Present Illness:    Left middle finger - DIP joint is in flexion, unable to actively extend it, but able to passively extend it. She felt pop in this joint yesterday when she was drying herself off with a towel.      Past Medical History:    Past Medical History:   Diagnosis Date    Chronic neck pain      Past Surgical History:    Past Surgical History:   Procedure Laterality Date    GYN SURGERY      Total hyst    OTHER ABDOMINAL SURGERY      choly     Social History:    Social History     Socioeconomic History    Marital status:      Spouse name: Not on file    Number of children: Not on file    Years of education: Not on file    Highest education level: Not on file   Occupational History    Not on file   Tobacco Use    Smoking status: Every Day    Smokeless tobacco: Never   Substance and Sexual Activity    Alcohol use: No    Drug use: No    Sexual activity: Not on file   Other Topics Concern    Not on file   Social History Narrative    Not on file     Social Determinants of Health     Financial Resource Strain: Not on file   Food Insecurity: Not on file   Transportation Needs: Not on file   Physical Activity: Not on file   Stress: Not on file   Social Connections: Not on file   Intimate Partner Violence: Not on file   Housing Stability: Not on file     Family History:    No family history on file.    Medications:    Current Outpatient Medications on File Prior to Visit   Medication Sig Dispense Refill    atorvastatin (LIPITOR) 20 MG Tab Take 20 mg by mouth every day.       No current facility-administered medications on file prior to visit.     Allergies:    No Known Allergies      Vitals:    Vitals:    06/18/23 0956   BP: 136/82   Pulse: 73   Resp: 18   Temp: 36 °C (96.8 °F)   TempSrc: Temporal   SpO2: 93%   Weight: 72.1 kg (159 lb)  "  Height: 1.676 m (5' 6\")       Physical Exam:    Constitutional: Vital signs reviewed. Appears well-developed and well-nourished. No acute distress.   Eyes: Sclera white, conjunctivae clear.  ENT: External ears normal. Hearing normal.  Cardiovascular: Peripheral pulses 2+.   Pulmonary/Chest: Respirations non-labored.  Musculoskeletal: Left middle finger: DIP joint is in flexion with tenderness to palpation and mild soft tissue swelling, unable to actively extend, but able to passively extend.  Neurological: Alert and oriented to person, place, and time. Muscle tone normal. Coordination normal. Light touch and sensation normal.   Skin: No rashes or lesions. Warm, dry, normal turgor.  Psychiatric: Normal mood and affect. Behavior is normal. Judgment and thought content normal.       Diagnostics:    DX-FINGER(S) 2+ LEFT  Order: [id number]  Status: Final result     Visible to patient: No (inaccessible in MyChart)     Next appt: None     Dx: Injury of finger of left hand, initia...     0 Result Notes  Details    Reading Physician Reading Date Result Priority   Charles Alanis M.D.  [phone] 2023 Ur[facility]     Narrative & Impression     2023 10:08 AM     HISTORY/REASON FOR EXAM:  Pain/Deformity Following Trauma; Room 2. Left 3rd finger - DIP joint is swollen and tender - looks like mallet finger. Happened last night.  Left 3rd finger pain.     TECHNIQUE/EXAM DESCRIPTION AND NUMBER OF VIEWS:  3 views of the LEFT fingers.     COMPARISON: None     FINDINGS:  There is no fracture.     Alignment is normal.     Osteoarthritis effects the scaphoid/trapezium articulation and the first carpometacarpal joint.     IMPRESSION:     Negative left 3rd finger series     I personally reviewed the images. Images and Rad report reviewed with her and copy of report to her.       Assessment / Plan & Medical Decision Makin. Injury of finger of left hand, initial encounter  - DX-FINGER(S) 2+ LEFT; Future    2. 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    Left middle finger - DIP joint is in flexion, unable to actively extend it, but able to passively extend it. She felt pop in this joint yesterday when she was drying herself off with a towel.    Left middle finger: DIP joint is in flexion with tenderness to palpation and mild soft tissue swelling, unable to actively extend, but able to passively extend.    Left middle finger x-ray: Negative left 3rd finger series.    Mallet finger info given and discussed.    Patient's left 3rd finger put in aluminum finger splint and advised she needs to keep the left 3rd DIP joint in extension all the time if possible for 4-6 weeks if she hopes for the damaged extensor tendon to reattach.    She reports she has Aleve which she can take as needed for pain and swelling for anti-inflammatory effect.    Referral to Orthopedics ordered for follow-up - advised she may need surgical repair.    She will return to urgent care if needed.

## 2023-08-15 ENCOUNTER — APPOINTMENT (RX ONLY)
Dept: URBAN - METROPOLITAN AREA CLINIC 4 | Facility: CLINIC | Age: 68
Setting detail: DERMATOLOGY
End: 2023-08-15

## 2023-08-15 DIAGNOSIS — D22 MELANOCYTIC NEVI: ICD-10-CM

## 2023-08-15 DIAGNOSIS — L81.4 OTHER MELANIN HYPERPIGMENTATION: ICD-10-CM

## 2023-08-15 DIAGNOSIS — Z71.89 OTHER SPECIFIED COUNSELING: ICD-10-CM

## 2023-08-15 DIAGNOSIS — L82.1 OTHER SEBORRHEIC KERATOSIS: ICD-10-CM

## 2023-08-15 DIAGNOSIS — Z09 ENCOUNTER FOR FOLLOW-UP EXAMINATION AFTER COMPLETED TREATMENT FOR CONDITIONS OTHER THAN MALIGNANT NEOPLASM: ICD-10-CM

## 2023-08-15 DIAGNOSIS — D18.0 HEMANGIOMA: ICD-10-CM

## 2023-08-15 PROBLEM — D22.5 MELANOCYTIC NEVI OF TRUNK: Status: ACTIVE | Noted: 2023-08-15

## 2023-08-15 PROBLEM — D18.01 HEMANGIOMA OF SKIN AND SUBCUTANEOUS TISSUE: Status: ACTIVE | Noted: 2023-08-15

## 2023-08-15 PROCEDURE — ? OBSERVATION

## 2023-08-15 PROCEDURE — ? COUNSELING

## 2023-08-15 PROCEDURE — 99213 OFFICE O/P EST LOW 20 MIN: CPT

## 2023-08-15 ASSESSMENT — LOCATION DETAILED DESCRIPTION DERM
LOCATION DETAILED: LEFT SUPERIOR MEDIAL UPPER BACK
LOCATION DETAILED: SUPERIOR MID FOREHEAD
LOCATION DETAILED: RIGHT MEDIAL UPPER BACK
LOCATION DETAILED: LEFT MEDIAL UPPER BACK

## 2023-08-15 ASSESSMENT — LOCATION SIMPLE DESCRIPTION DERM
LOCATION SIMPLE: LEFT UPPER BACK
LOCATION SIMPLE: RIGHT UPPER BACK
LOCATION SIMPLE: SUPERIOR FOREHEAD

## 2023-08-15 ASSESSMENT — LOCATION ZONE DERM
LOCATION ZONE: FACE
LOCATION ZONE: TRUNK